# Patient Record
Sex: MALE | Race: WHITE | NOT HISPANIC OR LATINO | URBAN - METROPOLITAN AREA
[De-identification: names, ages, dates, MRNs, and addresses within clinical notes are randomized per-mention and may not be internally consistent; named-entity substitution may affect disease eponyms.]

---

## 2017-01-20 ENCOUNTER — OFFICE (AMBULATORY)
Dept: URBAN - METROPOLITAN AREA CLINIC 75 | Facility: CLINIC | Age: 57
End: 2017-01-20

## 2017-01-20 VITALS
DIASTOLIC BLOOD PRESSURE: 70 MMHG | WEIGHT: 233 LBS | RESPIRATION RATE: 18 BRPM | HEART RATE: 76 BPM | HEIGHT: 73 IN | SYSTOLIC BLOOD PRESSURE: 130 MMHG

## 2017-01-20 DIAGNOSIS — R12 HEARTBURN: ICD-10-CM

## 2017-01-20 DIAGNOSIS — Z86.010 PERSONAL HISTORY OF COLONIC POLYPS: ICD-10-CM

## 2017-01-20 DIAGNOSIS — K57.30 DIVERTICULOSIS OF LARGE INTESTINE WITHOUT PERFORATION OR ABS: ICD-10-CM

## 2017-01-20 DIAGNOSIS — R07.9 CHEST PAIN, UNSPECIFIED: ICD-10-CM

## 2017-01-20 DIAGNOSIS — Z12.11 ENCOUNTER FOR SCREENING FOR MALIGNANT NEOPLASM OF COLON: ICD-10-CM

## 2017-01-20 PROCEDURE — 99214 OFFICE O/P EST MOD 30 MIN: CPT

## 2017-02-13 VITALS
RESPIRATION RATE: 17 BRPM | OXYGEN SATURATION: 99 % | DIASTOLIC BLOOD PRESSURE: 89 MMHG | RESPIRATION RATE: 20 BRPM | SYSTOLIC BLOOD PRESSURE: 111 MMHG | HEART RATE: 64 BPM | DIASTOLIC BLOOD PRESSURE: 77 MMHG | DIASTOLIC BLOOD PRESSURE: 75 MMHG | OXYGEN SATURATION: 100 % | HEART RATE: 61 BPM | OXYGEN SATURATION: 97 % | HEIGHT: 73 IN | DIASTOLIC BLOOD PRESSURE: 99 MMHG | HEART RATE: 69 BPM | DIASTOLIC BLOOD PRESSURE: 69 MMHG | RESPIRATION RATE: 16 BRPM | DIASTOLIC BLOOD PRESSURE: 72 MMHG | OXYGEN SATURATION: 96 % | SYSTOLIC BLOOD PRESSURE: 115 MMHG | OXYGEN SATURATION: 95 % | SYSTOLIC BLOOD PRESSURE: 121 MMHG | TEMPERATURE: 98 F | SYSTOLIC BLOOD PRESSURE: 126 MMHG | HEART RATE: 78 BPM | HEART RATE: 62 BPM | SYSTOLIC BLOOD PRESSURE: 131 MMHG | HEART RATE: 71 BPM | SYSTOLIC BLOOD PRESSURE: 138 MMHG | SYSTOLIC BLOOD PRESSURE: 153 MMHG | DIASTOLIC BLOOD PRESSURE: 86 MMHG | WEIGHT: 230 LBS | TEMPERATURE: 97.1 F

## 2017-02-14 ENCOUNTER — OFFICE (AMBULATORY)
Dept: URBAN - METROPOLITAN AREA CLINIC 64 | Facility: CLINIC | Age: 57
End: 2017-02-14

## 2017-02-14 ENCOUNTER — AMBULATORY SURGICAL CENTER (AMBULATORY)
Dept: URBAN - METROPOLITAN AREA SURGERY 17 | Facility: SURGERY | Age: 57
End: 2017-02-14

## 2017-02-14 DIAGNOSIS — R12 HEARTBURN: ICD-10-CM

## 2017-02-14 DIAGNOSIS — R07.9 CHEST PAIN, UNSPECIFIED: ICD-10-CM

## 2017-02-14 DIAGNOSIS — K31.9 DISEASE OF STOMACH AND DUODENUM, UNSPECIFIED: ICD-10-CM

## 2017-02-14 DIAGNOSIS — K29.70 GASTRITIS, UNSPECIFIED, WITHOUT BLEEDING: ICD-10-CM

## 2017-02-14 DIAGNOSIS — K31.7 POLYP OF STOMACH AND DUODENUM: ICD-10-CM

## 2017-02-14 LAB
GI HISTOLOGY: A. UNSPECIFIED: (no result)
GI HISTOLOGY: B. UNSPECIFIED: (no result)
GI HISTOLOGY: PDF REPORT: (no result)

## 2017-02-14 PROCEDURE — 43239 EGD BIOPSY SINGLE/MULTIPLE: CPT

## 2017-02-14 PROCEDURE — 88305 TISSUE EXAM BY PATHOLOGIST: CPT

## 2017-02-14 RX ADMIN — LIDOCAINE HYDROCHLORIDE 50 MG: 10 INJECTION, SOLUTION EPIDURAL; INFILTRATION; INTRACAUDAL; PERINEURAL at 10:17

## 2017-02-14 RX ADMIN — LIDOCAINE HYDROCHLORIDE 25 MG: 10 INJECTION, SOLUTION EPIDURAL; INFILTRATION; INTRACAUDAL; PERINEURAL at 10:19

## 2017-02-14 RX ADMIN — PROPOFOL 50 MG: 10 INJECTION, EMULSION INTRAVENOUS at 10:19

## 2017-02-14 RX ADMIN — PROPOFOL 50 MG: 10 INJECTION, EMULSION INTRAVENOUS at 10:24

## 2017-02-14 RX ADMIN — PROPOFOL 100 MG: 10 INJECTION, EMULSION INTRAVENOUS at 10:17

## 2018-04-04 ENCOUNTER — OFFICE VISIT CONVERTED (OUTPATIENT)
Dept: FAMILY MEDICINE CLINIC | Age: 58
End: 2018-04-04
Attending: NURSE PRACTITIONER

## 2018-06-20 ENCOUNTER — OFFICE VISIT CONVERTED (OUTPATIENT)
Dept: FAMILY MEDICINE CLINIC | Age: 58
End: 2018-06-20
Attending: NURSE PRACTITIONER

## 2019-01-15 ENCOUNTER — OFFICE VISIT CONVERTED (OUTPATIENT)
Dept: FAMILY MEDICINE CLINIC | Age: 59
End: 2019-01-15
Attending: NURSE PRACTITIONER

## 2019-02-01 ENCOUNTER — HOSPITAL ENCOUNTER (OUTPATIENT)
Dept: OTHER | Facility: HOSPITAL | Age: 59
Discharge: HOME | End: 2019-02-01
Attending: NURSE PRACTITIONER

## 2019-02-01 LAB
25(OH)D3 SERPL-MCNC: 38.2 NG/ML (ref 30–100)
ALBUMIN SERPL-MCNC: 4.3 G/DL (ref 3.5–5)
ALBUMIN/GLOB SERPL: 1.5 {RATIO} (ref 1.4–2.6)
ALP SERPL-CCNC: 72 U/L (ref 56–119)
ALT SERPL-CCNC: 28 U/L (ref 10–40)
ANION GAP SERPL CALC-SCNC: 20 MMOL/L (ref 8–19)
AST SERPL-CCNC: 23 U/L (ref 15–50)
BASOPHILS # BLD MANUAL: 0.03 10*3/UL (ref 0–0.2)
BASOPHILS NFR BLD MANUAL: 0.6 % (ref 0–3)
BILIRUB SERPL-MCNC: 1.07 MG/DL (ref 0.2–1.3)
BUN SERPL-MCNC: 14 MG/DL (ref 5–25)
BUN/CREAT SERPL: 14 {RATIO} (ref 6–20)
CALCIUM SERPL-MCNC: 9.2 MG/DL (ref 8.7–10.4)
CHLORIDE SERPL-SCNC: 100 MMOL/L (ref 99–111)
CHOLEST SERPL-MCNC: 187 MG/DL (ref 107–200)
CHOLEST/HDLC SERPL: 4.5 {RATIO} (ref 3–6)
CONV CO2: 26 MMOL/L (ref 22–32)
CONV TOTAL PROTEIN: 7.1 G/DL (ref 6.3–8.2)
CREAT UR-MCNC: 1.01 MG/DL (ref 0.7–1.2)
DEPRECATED RDW RBC AUTO: 41 FL
EOSINOPHIL # BLD MANUAL: 0.07 10*3/UL (ref 0–0.7)
EOSINOPHIL NFR BLD MANUAL: 1.4 % (ref 0–7)
ERYTHROCYTE [DISTWIDTH] IN BLOOD BY AUTOMATED COUNT: 12.1 % (ref 11.5–14.5)
GFR SERPLBLD BASED ON 1.73 SQ M-ARVRAT: >60 ML/MIN/{1.73_M2}
GLOBULIN UR ELPH-MCNC: 2.8 G/DL (ref 2–3.5)
GLUCOSE SERPL-MCNC: 96 MG/DL (ref 70–99)
GRANS (ABSOLUTE): 2.95 10*3/UL (ref 2–8)
GRANS: 59.1 % (ref 30–85)
HBA1C MFR BLD: 16.7 G/DL (ref 14–18)
HCT VFR BLD AUTO: 48.3 % (ref 42–52)
HDLC SERPL-MCNC: 42 MG/DL (ref 40–60)
IMM GRANULOCYTES # BLD: 0.01 10*3/UL (ref 0–0.54)
IMM GRANULOCYTES NFR BLD: 0.2 % (ref 0–0.43)
LDLC SERPL CALC-MCNC: 124 MG/DL (ref 70–100)
LYMPHOCYTES # BLD MANUAL: 1.3 10*3/UL (ref 1–5)
LYMPHOCYTES NFR BLD MANUAL: 12.6 % (ref 3–10)
MCH RBC QN AUTO: 31.3 PG (ref 27–31)
MCHC RBC AUTO-ENTMCNC: 34.6 G/DL (ref 33–37)
MCV RBC AUTO: 90.4 FL (ref 80–96)
MONOCYTES # BLD AUTO: 0.63 10*3/UL (ref 0.2–1.2)
OSMOLALITY SERPL CALC.SUM OF ELEC: 294 MOSM/KG (ref 273–304)
PLATELET # BLD AUTO: 172 10*3/UL (ref 130–400)
PMV BLD AUTO: 10.3 FL (ref 7.4–10.4)
POTASSIUM SERPL-SCNC: 4.3 MMOL/L (ref 3.5–5.3)
PSA SERPL-MCNC: 0.7 NG/ML (ref 0–4)
RBC # BLD AUTO: 5.34 10*6/UL (ref 4.7–6.1)
SODIUM SERPL-SCNC: 142 MMOL/L (ref 135–147)
TRIGL SERPL-MCNC: 106 MG/DL (ref 40–150)
TSH SERPL-ACNC: 3.66 M[IU]/L (ref 0.27–4.2)
VARIANT LYMPHS NFR BLD MANUAL: 26.1 % (ref 20–45)
VLDLC SERPL-MCNC: 21 MG/DL (ref 5–37)
WBC # BLD AUTO: 4.99 10*3/UL (ref 4.8–10.8)

## 2019-02-03 LAB
SHBG SERPL-SCNC: 54.5 NMOL/L (ref 19.3–76.4)
TESTOST SERPL-MCNC: 584 NG/DL (ref 264–916)
TESTOSTERONE, FREE: 9 PG/ML (ref 7.2–24)

## 2019-02-11 ENCOUNTER — HOSPITAL ENCOUNTER (OUTPATIENT)
Dept: OTHER | Facility: HOSPITAL | Age: 59
Discharge: HOME | End: 2019-02-11
Attending: NURSE PRACTITIONER

## 2019-02-11 ENCOUNTER — OFFICE VISIT CONVERTED (OUTPATIENT)
Dept: FAMILY MEDICINE CLINIC | Age: 59
End: 2019-02-11
Attending: NURSE PRACTITIONER

## 2019-07-24 ENCOUNTER — OFFICE VISIT CONVERTED (OUTPATIENT)
Dept: FAMILY MEDICINE CLINIC | Age: 59
End: 2019-07-24
Attending: NURSE PRACTITIONER

## 2019-07-26 ENCOUNTER — HOSPITAL ENCOUNTER (OUTPATIENT)
Dept: OTHER | Facility: HOSPITAL | Age: 59
Discharge: HOME | End: 2019-07-26
Attending: NURSE PRACTITIONER

## 2019-07-26 LAB
ANION GAP SERPL CALC-SCNC: 16 MMOL/L (ref 8–19)
BUN SERPL-MCNC: 14 MG/DL (ref 5–25)
BUN/CREAT SERPL: 13 {RATIO} (ref 6–20)
CALCIUM SERPL-MCNC: 9 MG/DL (ref 8.7–10.4)
CHLORIDE SERPL-SCNC: 99 MMOL/L (ref 99–111)
CONV CO2: 26 MMOL/L (ref 22–32)
CREAT UR-MCNC: 1.07 MG/DL (ref 0.7–1.2)
GFR SERPLBLD BASED ON 1.73 SQ M-ARVRAT: >60 ML/MIN/{1.73_M2}
GLUCOSE SERPL-MCNC: 100 MG/DL (ref 70–99)
OSMOLALITY SERPL CALC.SUM OF ELEC: 285 MOSM/KG (ref 273–304)
POTASSIUM SERPL-SCNC: 4.2 MMOL/L (ref 3.5–5.3)
SODIUM SERPL-SCNC: 137 MMOL/L (ref 135–147)

## 2020-03-04 ENCOUNTER — OFFICE VISIT CONVERTED (OUTPATIENT)
Dept: FAMILY MEDICINE CLINIC | Age: 60
End: 2020-03-04
Attending: NURSE PRACTITIONER

## 2020-05-29 ENCOUNTER — HOSPITAL ENCOUNTER (OUTPATIENT)
Dept: OTHER | Facility: HOSPITAL | Age: 60
Discharge: HOME | End: 2020-05-29
Attending: NURSE PRACTITIONER

## 2020-05-29 LAB
ALBUMIN SERPL-MCNC: 4.1 G/DL (ref 3.5–5)
ALBUMIN/GLOB SERPL: 1.8 {RATIO} (ref 1.4–2.6)
ALP SERPL-CCNC: 71 U/L (ref 56–119)
ALT SERPL-CCNC: 19 U/L (ref 10–40)
ANION GAP SERPL CALC-SCNC: 14 MMOL/L (ref 8–19)
AST SERPL-CCNC: 21 U/L (ref 15–50)
BILIRUB SERPL-MCNC: 0.87 MG/DL (ref 0.2–1.3)
BUN SERPL-MCNC: 13 MG/DL (ref 5–25)
BUN/CREAT SERPL: 13 {RATIO} (ref 6–20)
CALCIUM SERPL-MCNC: 9.2 MG/DL (ref 8.7–10.4)
CHLORIDE SERPL-SCNC: 102 MMOL/L (ref 99–111)
CHOLEST SERPL-MCNC: 160 MG/DL (ref 107–200)
CHOLEST/HDLC SERPL: 4.6 {RATIO} (ref 3–6)
CONV CO2: 25 MMOL/L (ref 22–32)
CONV TOTAL PROTEIN: 6.4 G/DL (ref 6.3–8.2)
CREAT UR-MCNC: 1.03 MG/DL (ref 0.7–1.2)
GFR SERPLBLD BASED ON 1.73 SQ M-ARVRAT: >60 ML/MIN/{1.73_M2}
GLOBULIN UR ELPH-MCNC: 2.3 G/DL (ref 2–3.5)
GLUCOSE SERPL-MCNC: 96 MG/DL (ref 70–99)
HDLC SERPL-MCNC: 35 MG/DL (ref 40–60)
LDLC SERPL CALC-MCNC: 97 MG/DL (ref 70–100)
OSMOLALITY SERPL CALC.SUM OF ELEC: 284 MOSM/KG (ref 273–304)
POTASSIUM SERPL-SCNC: 4.3 MMOL/L (ref 3.5–5.3)
SODIUM SERPL-SCNC: 137 MMOL/L (ref 135–147)
TRIGL SERPL-MCNC: 140 MG/DL (ref 40–150)
VLDLC SERPL-MCNC: 28 MG/DL (ref 5–37)

## 2020-09-24 ENCOUNTER — OFFICE VISIT CONVERTED (OUTPATIENT)
Dept: FAMILY MEDICINE CLINIC | Age: 60
End: 2020-09-24
Attending: NURSE PRACTITIONER

## 2020-09-29 ENCOUNTER — HOSPITAL ENCOUNTER (OUTPATIENT)
Dept: OTHER | Facility: HOSPITAL | Age: 60
Discharge: HOME | End: 2020-09-29
Attending: NURSE PRACTITIONER

## 2020-09-29 LAB
ALBUMIN SERPL-MCNC: 4 G/DL (ref 3.5–5)
ALBUMIN/GLOB SERPL: 1.7 {RATIO} (ref 1.4–2.6)
ALP SERPL-CCNC: 71 U/L (ref 56–119)
ALT SERPL-CCNC: 21 U/L (ref 10–40)
ANION GAP SERPL CALC-SCNC: 13 MMOL/L (ref 8–19)
AST SERPL-CCNC: 21 U/L (ref 15–50)
BILIRUB SERPL-MCNC: 0.69 MG/DL (ref 0.2–1.3)
BUN SERPL-MCNC: 14 MG/DL (ref 5–25)
BUN/CREAT SERPL: 12 {RATIO} (ref 6–20)
CALCIUM SERPL-MCNC: 9.2 MG/DL (ref 8.7–10.4)
CHLORIDE SERPL-SCNC: 99 MMOL/L (ref 99–111)
CHOLEST SERPL-MCNC: 192 MG/DL (ref 107–200)
CHOLEST/HDLC SERPL: 4.6 {RATIO} (ref 3–6)
CONV CO2: 28 MMOL/L (ref 22–32)
CONV TOTAL PROTEIN: 6.4 G/DL (ref 6.3–8.2)
CREAT UR-MCNC: 1.14 MG/DL (ref 0.7–1.2)
GFR SERPLBLD BASED ON 1.73 SQ M-ARVRAT: >60 ML/MIN/{1.73_M2}
GLOBULIN UR ELPH-MCNC: 2.4 G/DL (ref 2–3.5)
GLUCOSE SERPL-MCNC: 102 MG/DL (ref 70–99)
HDLC SERPL-MCNC: 42 MG/DL (ref 40–60)
LDLC SERPL CALC-MCNC: 129 MG/DL (ref 70–100)
OSMOLALITY SERPL CALC.SUM OF ELEC: 283 MOSM/KG (ref 273–304)
POTASSIUM SERPL-SCNC: 4.4 MMOL/L (ref 3.5–5.3)
SODIUM SERPL-SCNC: 136 MMOL/L (ref 135–147)
TRIGL SERPL-MCNC: 103 MG/DL (ref 40–150)
VLDLC SERPL-MCNC: 21 MG/DL (ref 5–37)

## 2021-03-09 ENCOUNTER — OFFICE VISIT CONVERTED (OUTPATIENT)
Dept: FAMILY MEDICINE CLINIC | Age: 61
End: 2021-03-09
Attending: NURSE PRACTITIONER

## 2021-04-16 ENCOUNTER — HOSPITAL ENCOUNTER (OUTPATIENT)
Dept: OTHER | Facility: HOSPITAL | Age: 61
Discharge: HOME | End: 2021-04-16
Attending: NURSE PRACTITIONER

## 2021-04-16 LAB
ALBUMIN SERPL-MCNC: 4 G/DL (ref 3.5–5)
ALBUMIN/GLOB SERPL: 1.8 {RATIO} (ref 1.4–2.6)
ALP SERPL-CCNC: 72 U/L (ref 56–119)
ALT SERPL-CCNC: 21 U/L (ref 10–40)
ANION GAP SERPL CALC-SCNC: 12 MMOL/L (ref 8–19)
AST SERPL-CCNC: 22 U/L (ref 15–50)
BILIRUB SERPL-MCNC: 1.07 MG/DL (ref 0.2–1.3)
BUN SERPL-MCNC: 18 MG/DL (ref 5–25)
BUN/CREAT SERPL: 16 {RATIO} (ref 6–20)
CALCIUM SERPL-MCNC: 9 MG/DL (ref 8.7–10.4)
CHLORIDE SERPL-SCNC: 99 MMOL/L (ref 99–111)
CHOLEST SERPL-MCNC: 166 MG/DL (ref 107–200)
CHOLEST/HDLC SERPL: 3.8 {RATIO} (ref 3–6)
CONV CO2: 28 MMOL/L (ref 22–32)
CONV TOTAL PROTEIN: 6.2 G/DL (ref 6.3–8.2)
CREAT UR-MCNC: 1.11 MG/DL (ref 0.7–1.2)
GFR SERPLBLD BASED ON 1.73 SQ M-ARVRAT: >60 ML/MIN/{1.73_M2}
GLOBULIN UR ELPH-MCNC: 2.2 G/DL (ref 2–3.5)
GLUCOSE SERPL-MCNC: 92 MG/DL (ref 70–99)
HDLC SERPL-MCNC: 44 MG/DL (ref 40–60)
LDLC SERPL CALC-MCNC: 102 MG/DL (ref 70–100)
OSMOLALITY SERPL CALC.SUM OF ELEC: 282 MOSM/KG (ref 273–304)
POTASSIUM SERPL-SCNC: 4.2 MMOL/L (ref 3.5–5.3)
SODIUM SERPL-SCNC: 135 MMOL/L (ref 135–147)
TRIGL SERPL-MCNC: 100 MG/DL (ref 40–150)
VLDLC SERPL-MCNC: 20 MG/DL (ref 5–37)

## 2021-05-18 NOTE — PROGRESS NOTES
"Filemon Lazaro 1960     Office/Outpatient Visit    Visit Date: Mon, Feb 11, 2019 04:29 pm    Provider: Stacey Gonzalez N.P. (Assistant: Sarah Spurling, MA)    Location: [facility]        Electronically signed by Stacey Gonzalez N.P. on  02/11/2019 05:59:02 PM                             SUBJECTIVE:        CC:     Jose Luis is a 58 year old White male.  Prevenative Exam.          HPI:         Patient complains of health checkup.  His last physical exam was 2 years ago.      Smoking Status:  Nonsmoker HEALTH RISK PROFILE (\"At Risk\" items are starred):         Smoking: Life-long non-smoker;     Immunization Status: Up to date;     Nutrition: Healthy, well-balanced diet;     Physical Activity: Exercises at least 3 times per week;     Alcohol/Drug Use: Is a non-drinker; No illicit drug use;     Safety: Always wears seatbelt;         PHQ-9 Depression Screening: Completed form scanned and in chart; Total Score 0 Alcohol Consumption Screening: Completed form scanned and in chart; Total Score 0     ROS:     CONSTITUTIONAL:  Positive for fatigue ( severe ).   Negative for chills or fever.      EYES:  Negative for blurred vision.      E/N/T:  Negative for nasal congestion and sore throat.      CARDIOVASCULAR:  Negative for chest pain and palpitations.      RESPIRATORY:  Negative for recent cough and dyspnea.      GASTROINTESTINAL:  Negative for abdominal pain, nausea and vomiting.      GENITOURINARY:  Positive for nocturia (2 X per night).      MUSCULOSKELETAL:  Negative for myalgias.      INTEGUMENTARY:  Negative for atypical mole(s) and rash.      NEUROLOGICAL:  Negative for paresthesias and weakness.      PSYCHIATRIC:  Positive for insomnia.   Negative for anxiety or depression.  he does not have issues going to sleep, does not usually snores, does get up to take dog out and go to the bathroom         PM/FMH/SH:     Last Reviewed on 2/11/2019 05:28 PM by Stacey Gonzalez    Past Medical History:     "     Hypertension         Surgical History:         Vasectomy     Procedures: EGD 2017     COLONOSCOPY: was last done 2016 with the following abnormalaties noted-- hemorrhoids and diverticuli The next one is due   [facility]         Family History:     Father:  at age 75; Cause of death was COPD;  Hypertension;  COPD     Mother: Healthy     Brother(s): Healthy; 1 brother(s) total     Sister(s): Healthy; 1 sister(s) total     Paternal Grandfather:  at age 72;  Hypertension     Paternal Grandmother:  at age 70's; Cause of death was stroke;  Hypertension     Maternal Grandfather:  at age 81; Cause of death was COPD     Maternal Grandmother:  at age 95; Cause of death was pneumonia         Social History:     Occupation: Employed at [employer] technician     Marital Status:      Children: 1 child         Tobacco/Alcohol/Supplements:     Last Reviewed on 2019 04:30 PM by Spurling, Sarah C    Tobacco: He has never smoked.  Non-drinker         Substance Abuse History:     Last Reviewed on 2016 03:59 PM by Tad Borden    None         Mental Health History:     Last Reviewed on 2016 03:59 PM by Tad Borden        Communicable Diseases (eg STDs):     Last Reviewed on 2016 03:59 PM by Tad Borden            Immunizations:     Hep A, adult dose 2018     Hep A, adult dose 1/15/2019     Fluarix pf 2018     Flulaval 2010     Fluzone (3 + years dose) 2008     Fluzone (3 + years dose) 10/17/2017     Fluzone Quadrivalent (3+ years) 2016     Influenza, split virus (3+ years dose) 10/31/2007     Adacel (Tdap) 2016         Allergies:     Last Reviewed on 2019 04:30 PM by Spurling, Sarah C      No Known Drug Allergies.         Current Medications:     Last Reviewed on 2019 04:34 PM by Spurling, Sarah C    Amlodipine  5mg Tablet Take 1 tablet(s) by mouth daily     Benazepril HCl 20mg Tablet Take  1 tablet(s) by mouth daily     Hydrochlorothiazide (HCTZ) 25mg Tablet Take 1/2 tablet(s) by mouth daily     Prilosec OTC 20mg Tablets, Delayed Release 1 po QD     Fish Oil 500mg Softgel capsule     Multivitamins Tablet 1 tab daily     Aspirin (ASA) 325mg Tablet 1/2 tablet qd         OBJECTIVE:        Vitals:         Current: 2/11/2019 4:37:17 PM    Ht:  6 ft, 1 in;  Wt: 242.6 lbs;  BMI: 32.0    T: 97.9 F (oral);  BP: 129/78 mm Hg (right arm, sitting);  P: 62 bpm (right arm (BP Cuff), sitting);  sCr: 1.01 mg/dL;  GFR: 91.44        Exams:     PHYSICAL EXAM:     GENERAL: Vitals recorded well developed, well nourished;  well groomed;  no apparent distress;     EYES: lids and lacrimal system are normal in appearance; extraocular movements intact; conjunctiva and cornea are normal; PERRLA;     E/N/T:  normal EACs, TMs, nasal/oral mucosa, teeth, gingiva, and oropharynx;     NECK:  supple, full ROM; no thyromegaly; no carotid bruits;     RESPIRATORY: normal respiratory rate and pattern with no distress; normal breath sounds with no rales, rhonchi, wheezes or rubs;     CARDIOVASCULAR: normal rate; rhythm is regular;  no systolic murmur; no edema;     GASTROINTESTINAL: nontender, nondistended; no hepatosplenomegaly or masses; no bruits;     LYMPHATIC: no enlargement of cervical or facial nodes;     SKIN:  no significant rashes or lesions; no suspicious moles;     MUSCULOSKELETAL:  Normal range of motion, strength and tone;     NEUROLOGIC: GROSSLY INTACT     PSYCHIATRIC:  appropriate affect and demeanor; normal speech pattern; grossly normal memory;         ASSESSMENT           V70.0   Z00.00  Health checkup              DDx:     V79.0   Z13.89  Screening for depression              DDx:     780.79   R53.83  Fatigue              DDx:         ORDERS:         Lab Orders:       [id number]  [facility] Urinalysis, automated, with micro  (Send-Out)           Procedures Ordered:       REFER  Referral to Specialist or Other Facility   (Send-Out)           Other Orders:         Calculated BMI above the upper parameter and a follow-up plan was documented in the medical record  (In-House)           Negative EtOH screen  (In-House)                   PLAN:          Health checkup send for sleep study /reviewed recent labs he had     LABORATORY:  Labs ordered to be performed today include urinalysis with micro.  MIPS     BMI Elevated - Follow-Up Plan: He was provided education on weight loss strategies     COUNSELING was provided today regarding the following topics: healthy eating habits, weight loss program, low salt diet, regular exercise, and use of seat belts.      FOLLOW-UP: Schedule follow-up appointments on a p.r.n. basis.            Orders:       [id number]  [facility] - [facility] Urinalysis, automated, with micro  (Send-Out)           Calculated BMI above the upper parameter and a follow-up plan was documented in the medical record  (In-House)             Patient Education Handouts:       Physical Exam 50-59 year, Male           Screening for depression     MIPS PHQ-9 Depression Screening Completed form scanned and in chart; Total Score 0 Negative alcohol screen           Orders:         Negative EtOH screen  (In-House)            Fatigue         REFERRALS:  Referral initiated to sleep study, evaluation/ fatigue.            Orders:       REFER  Referral to Specialist or Other Facility  (Send-Out)             Patient Education Handouts:       Sleep Apnea              Patient Recommendations:        For  Health checkup:     Limit dietary intake of fat (especially saturated fat) and cholesterol.  Eat a variety of foods, including plenty of fruits, vegetables, and grain containg fiber, limit fat intake to 30% of total calories. Balance caloric intake with energy expended. Maintaining regular physical activity is advised to help prevent heart disease, hypertension, diabetes, and obesity. Always use shoulder/lap restraints when driving or  riding in a vehicle, even those equipped with air bags.  Schedule follow-up appointments as needed.          For  Fatigue:     I also recommend sleep study, evaluation/ fatigue.              CHARGE CAPTURE           **Please note: ICD descriptions below are intended for billing purposes only and may not represent clinical diagnoses**        Primary Diagnosis:         V70.0 Health checkup            Z00.00    Encounter for general adult medical examination without abnormal findings              Orders:          [id number]   Preventive medicine, established patient, age 40-64 years  (In-House)                Calculated BMI above the upper parameter and a follow-up plan was documented in the medical record  (In-House)           V79.0 Screening for depression            Z13.89    Encounter for screening for other disorder              Orders:             Negative EtOH screen  (In-House)           780.79 Fatigue            R53.83    Other fatigue

## 2021-05-18 NOTE — PROGRESS NOTES
Filemon Lazaro  1960     Office/Outpatient Visit    Visit Date: Thu, Sep 24, 2020 09:47 am    Provider: Stacey Gonzalez NREE (Assistant: Cheo Solomon, )    Location: [facility]        Electronically signed by Stacey Gonzalez N.P. on  2020 12:50:38 PM                             Subjective:        CC: Jose Luis is a 59 year old White male.  This is a follow-up visit.          HPI:           PHQ-9 Depression Screening: Completed form scanned and in chart; Total Score 0           Dx with essential (primary) hypertension; his current cardiac medication regimen includes a diuretic ( Hydrochlorothiazide (HCTZ) ), an ACE inhibitor ( Lotensin ), and a calcium channel blocker ( Norvasc ).  He is tolerating the medication well without side effects.  Compliance with treatment has been good; he takes his medication as directed.      ROS:     CONSTITUTIONAL:  Negative for chills, fatigue, fever, and weight change.      CARDIOVASCULAR:  Negative for chest pain, palpitations, tachycardia, orthopnea, and edema.      RESPIRATORY:  Negative for cough, dyspnea, and hemoptysis.      GASTROINTESTINAL:  Positive for acid reflux symptoms.  takes PPI     GENITOURINARY:  Positive for erectile dysfunction.  for a year, would like to try viagra    NEUROLOGICAL:  Negative for dizziness, headaches, paresthesias, and weakness.          Past Medical History / Family History / Social History:         Last Reviewed on 2020 12:45 PM by Stacey Gonzalez    Past Medical History:             PAST MEDICAL HISTORY         Hypertension     Sleep Apnea: dx'd in ;         Surgical History:         Vasectomy     Procedures: EGD 2017     COLONOSCOPY: was last done 2016 with the following abnormalaties noted-- hemorrhoids and diverticuli The next one is due   [facility]         Family History:     Father:  at age 75; Cause of death was COPD;  Hypertension;  COPD     Mother: Healthy     Brother(s):  Healthy; 1 brother(s) total     Sister(s): Healthy; 1 sister(s) total     Paternal Grandfather:  at age 72;  Hypertension     Paternal Grandmother:  at age 70's; Cause of death was stroke;  Hypertension     Maternal Grandfather:  at age 81; Cause of death was COPD     Maternal Grandmother:  at age 95; Cause of death was pneumonia         Social History:     Occupation: [employer] technician     Marital Status:      Children: 1 child         Tobacco/Alcohol/Supplements:     Last Reviewed on 2020 09:50 AM by Cheo Solomon    Tobacco: He has never smoked.  Non-drinker         Substance Abuse History:     Last Reviewed on 2016 03:59 PM by Tad Borden    None         Mental Health History:     Last Reviewed on 2016 03:59 PM by Tad Borden        Communicable Diseases (eg STDs):     Last Reviewed on 2016 03:59 PM by Tad Borden        Immunizations:     influenza, injectable, quadrivalent, preservative free 3/4/2020    Hep A, adult dose 2018    Hep A, adult dose 1/15/2019    Fluarix pf 2018    Flulaval 2010    Fluzone (3 + years dose) 2008    Fluzone (3 + years dose) 10/17/2017    Fluzone Quadrivalent (3+ years) 2016    Influenza, split virus (3+ years dose) 10/31/2007    Adacel (Tdap) 2016        Allergies:     Last Reviewed on 2020 09:50 AM by Cheo Solomon    No Known Allergies.        Current Medications:     Last Reviewed on 2020 09:51 AM by Cheo Solomon    hydroCHLOROthiazide 25 mg oral tablet [Take 1/2 (one-half) tablet by mouth once daily]    amLODIPine 5 mg oral tablet [Take 1 tablet(s) by mouth daily]    benazepriL 20 mg oral tablet [Take 1 tablet by mouth once daily]    aspirin 325 mg oral tablet [1/2 tablet qd]    multivitamin oral tablet [1 tab daily]    Prilosec OTC 20 mg oral tablet, delayed release (enteric coated) [1 po QD ]    Fish Oil 300-500 mg oral capsule     triamcinolone acetonide 0.1 % Topical Cream [apply a thin layer to the affected area(s) by topical route 2 times per day]        Objective:        Vitals:         Current: 9/24/2020 9:53:07 AM    Ht:  6 ft, 1 in;  Wt: 234.6 lbs;  BMI: 31.0T: 97.7 F (temporal);  BP: 130/80 mm Hg (right arm, sitting);  P: 60 bpm (right arm (BP Cuff), sitting);  sCr: 1.03 mg/dL;  GFR: 87.36        Exams:     PHYSICAL EXAM:     GENERAL: Vitals recorded well developed, well nourished;  well groomed;  no apparent distress;     NECK: carotid exam reveals no bruits;     RESPIRATORY: normal respiratory rate and pattern with no distress; normal breath sounds with no rales, rhonchi, wheezes or rubs;     CARDIOVASCULAR: normal rate; rhythm is regular;  no systolic murmur; no edema;     PSYCHIATRIC:  appropriate affect and demeanor; normal speech pattern; grossly normal memory;         Assessment:         Z13.31   Encounter for screening for depression       Z23   Encounter for immunization       I10   Essential (primary) hypertension       N52.9   Male erectile dysfunction, unspecified           ORDERS:         Meds Prescribed:       [New Rx] Viagra 100 mg oral tablet [take 1/2 to 1  tablet (100 mg) by oral route once daily as needed approximately 1 hour before sexual activity], #6 (six) tablets, Refills: 1 (one)       [Refilled] benazepriL 20 mg oral tablet [Take 1 tablet by mouth once daily], #90 (ninety) tablets, Refills: 0 (zero)       [Refilled] hydroCHLOROthiazide 25 mg oral tablet [Take 1/2 (one-half) tablet by mouth once daily], #45 (forty five) tablets, Refills: 0 (zero)       [Refilled] amLODIPine 5 mg oral tablet [Take 1 tablet(s) by mouth daily], #90 (ninety) tablets, Refills: 0 (zero)         Lab Orders:       FUTURE  Future order to be done at patients convenience  (Send-Out)            [id number]  [facility] CMP AND LIPID: [id number], [id number]  (Send-Out)              Procedures Ordered:       [id number]  Immunization administration; one vaccine   (In-House)              Other Orders:       [id number]  Influenza virus vaccine, quadrivalent, split virus, preservative free 3 years of age & older  (In-House)              Depression screen negative  (In-House)                      Plan:         [facility]    MIPS PHQ-9 Depression Screening: Completed form scanned and in chart; Total Score 0; Negative Depression Screen           Orders:         Depression screen negative  (In-House)              Encounter for immunization          Immunizations:       [id number]  Immunization administration; one vaccine  (In-House)            31894  Influenza virus vaccine, quadrivalent, split virus, preservative free 3 years of age & older  (In-House)                Dose (ml): 0.5  Site: left deltoid  Route: intramuscular  Administered by: Cheo Solomon          : [email or web address]  Lot #: [id number]  Exp: 06/30/2021          NDC: [address]-26        Essential (primary) hypertensionreviewed last lab in May 2020        FOLLOW-UP TESTING #1: FOLLOW-UP LABORATORY:  Labs to be scheduled in the future include HTN/Lipid Panel: CMP, Lipid.      RECOMMENDATIONS given include: perform routine monitoring of blood pressure with home blood pressure cuff, exercise, and reduction of dietary salt intake.      FOLLOW-UP: fasting for labs           Prescriptions:       [Refilled] benazepriL 20 mg oral tablet [Take 1 tablet by mouth once daily], #90 (ninety) tablets, Refills: 0 (zero)       [Refilled] hydroCHLOROthiazide 25 mg oral tablet [Take 1/2 (one-half) tablet by mouth once daily], #45 (forty five) tablets, Refills: 0 (zero)       [Refilled] amLODIPine 5 mg oral tablet [Take 1 tablet(s) by mouth daily], #90 (ninety) tablets, Refills: 0 (zero)           Orders:       FUTURE  Future order to be done at patients convenience  (Send-Out)            [id number]  HTN - [facility] CMP AND LIPID: [id number], [id number]  (Send-Out)              Male erectile  dysfunction, unspecifiedrisk vs benefit of rx discussed, trial of viagra /reviewed testosterone lab from past           Prescriptions:       [New Rx] Viagra 100 mg oral tablet [take 1/2 to 1  tablet (100 mg) by oral route once daily as needed approximately 1 hour before sexual activity], #6 (six) tablets, Refills: 1 (one)             Patient Recommendations:        For  Essential (primary) hypertension:            The following laboratory testing has been ordered: Begin monitoring your blood pressure by brief nurse visits at our office, a home blood pressure monitor, or by checking on the machines in pharmacies or stores.  Keep a log of the readings. Maintain a regular exercise program. Reduce the amount of salt in your diet.              Charge Capture:         Primary Diagnosis:     Z13.31  Encounter for screening for depression           Orders:      [id number]  Office/outpatient visit; established patient, level 4  (In-House)              Depression screen negative  (In-House)              Z23  Encounter for immunization           Orders:      41781  Immunization administration; one vaccine  (In-House)            [id number]  Influenza virus vaccine, quadrivalent, split virus, preservative free 3 years of age & older  (In-House)              I10  Essential (primary) hypertension     N52.9  Male erectile dysfunction, unspecified

## 2021-05-18 NOTE — PROGRESS NOTES
Filemon LazaroMini 1960     Office/Outpatient Visit    Visit Date:  10:50 am    Provider: Stacey Gonzalez N.P. (Assistant: Nancie Cruz MA)    Location: [facility]        Electronically signed by Stacey Gonzalez N.P. on  2019 12:36:53 PM                             SUBJECTIVE:        CC:     Jose Luis is a 58 year old White male.  This is a follow-up visit.  med refill; discuss sleep study         HPI:         Patient presents with essential hypertension.  His current cardiac medication regimen includes a diuretic ( HCTZ ), an ACE inhibitor ( Lotensin ), and a calcium channel blocker ( Norvasc ).  Jose Luis does not check his blood pressure other than at his clinic appointments.  Compliance with treatment has been good; he takes his medication as directed.      ROS:     CONSTITUTIONAL:  Positive for fatigue.      CARDIOVASCULAR:  Negative for chest pain, palpitations, tachycardia, orthopnea, and edema.      RESPIRATORY:  Positive for + sleep study for sleep apnea, has not gotten suppliies to treat yet due to insurance/ cost.   Negative for recent cough or dyspnea.      GASTROINTESTINAL:  Positive for acid reflux symptoms ( takes PPI ).      NEUROLOGICAL:  Negative for dizziness, headaches, paresthesias, and weakness.          [facility]/SH:     Last Reviewed on 2019 11:18 AM by Stacey Gonzalez    Past Medical History:             PAST MEDICAL HISTORY         Hypertension     Sleep Apnea: dx'd in ;         Surgical History:         Vasectomy     Procedures: EGD 2017     COLONOSCOPY: was last done 2016 with the following abnormalaties noted-- hemorrhoids and diverticuli The next one is due   [facility]         Family History:     Father:  at age 75; Cause of death was COPD;  Hypertension;  COPD     Mother: Healthy     Brother(s): Healthy; 1 brother(s) total     Sister(s): Healthy; 1 sister(s) total     Paternal Grandfather:  at age 72;  Hypertension      Paternal Grandmother:  at age 70's; Cause of death was stroke;  Hypertension     Maternal Grandfather:  at age 81; Cause of death was COPD     Maternal Grandmother:  at age 95; Cause of death was pneumonia         Social History:     Occupation: Employed at [employer] technician     Marital Status:      Children: 1 child         Tobacco/Alcohol/Supplements:     Last Reviewed on 2019 10:55 AM by Nancie Cruz    Tobacco: He has never smoked.  Non-drinker         Substance Abuse History:     Last Reviewed on 2016 03:59 PM by Tad Borden    None         Mental Health History:     Last Reviewed on 2016 03:59 PM by Tad Borden        Communicable Diseases (eg STDs):     Last Reviewed on 2016 03:59 PM by Tad Borden            Immunizations:     Hep A, adult dose 2018     Hep A, adult dose 1/15/2019     Fluarix pf 2018     Flulaval 2010     Fluzone (3 + years dose) 2008     Fluzone (3 + years dose) 10/17/2017     Fluzone Quadrivalent (3+ years) 2016     Influenza, split virus (3+ years dose) 10/31/2007     Adacel (Tdap) 2016         Allergies:     Last Reviewed on 2019 10:55 AM by Nancie Cruz      No Known Drug Allergies.         Current Medications:     Last Reviewed on 2019 10:55 AM by Nancie Cruz    Amlodipine  5mg Tablet Take 1 tablet(s) by mouth daily     Benazepril HCl 20mg Tablet Take 1 tablet(s) by mouth daily     Hydrochlorothiazide (HCTZ) 25mg Tablet Take 1/2 tablet(s) by mouth daily     Prilosec OTC 20mg Tablets, Delayed Release 1 po QD     Fish Oil 500mg Softgel capsule     Multivitamins Tablet 1 tab daily     Aspirin (ASA) 325mg Tablet 1/2 tablet qd         OBJECTIVE:        Vitals:         Current: 2019 10:57:07 AM    Ht:  6 ft, 1 in;  Wt: 241.4 lbs;  BMI: 31.8    T: 98.4 F (oral);  BP: 126/74 mm Hg (left arm, sitting);  P: 67 bpm (left arm (BP  Cuff), sitting);  sCr: 1.01 mg/dL;  GFR: 91.25        Exams:     PHYSICAL EXAM:     GENERAL: Vitals recorded well developed, well nourished;  well groomed;  no apparent distress;     NECK: carotid exam reveals no bruits;     RESPIRATORY: normal respiratory rate and pattern with no distress; normal breath sounds with no rales, rhonchi, wheezes or rubs;     CARDIOVASCULAR: normal rate; rhythm is regular;  no systolic murmur; no edema;     PSYCHIATRIC:  appropriate affect and demeanor; normal speech pattern; grossly normal memory;         ASSESSMENT           401.1   I10  Essential hypertension              DDx:     780.57   G47.33  ROSIBEL              DDx:         ORDERS:         Meds Prescribed:       Refill of: Amlodipine  5mg Tablet Take 1 tablet(s) by mouth daily  #90 (Ninety) tablet(s) Refills: 1       Refill of: Benazepril HCl 20mg Tablet Take 1 tablet(s) by mouth daily  #90 (Ninety) tablet(s) Refills: 1       Refill of: Hydrochlorothiazide (HCTZ) 25mg Tablet Take 1/2 tablet(s) by mouth daily  #45 (Forty Five) tablet(s) Refills: 1         Lab Orders:       [id number]  [facility] Basic Metabolic Panel  (Send-Out)         APPTO  Appointment need  (In-House)                   PLAN:          Essential hypertension reviewed last labs, will check fasting lab at next appt     LABORATORY:  Labs ordered to be performed today include basic metabolic panel.      FOLLOW-UP: Schedule a follow-up visit in 6 months..      RECOMMENDATIONS given include: perform routine monitoring of blood pressure with home blood pressure cuff, exercise, reduction of dietary salt intake, stress reduction, and Advised about free BP checks on the last Saturday of each month.            Prescriptions:       Refill of: Amlodipine  5mg Tablet Take 1 tablet(s) by mouth daily  #90 (Ninety) tablet(s) Refills: 1       Refill of: Benazepril HCl 20mg Tablet Take 1 tablet(s) by mouth daily  #90 (Ninety) tablet(s) Refills: 1       Refill of: Hydrochlorothiazide  (HCTZ) 25mg Tablet Take 1/2 tablet(s) by mouth daily  #45 (Forty Five) tablet(s) Refills: 1           Orders:       [id number]  [facility] Basic Metabolic Panel  (Send-Out)         APPTO  Appointment need  (In-House)            ROSIBEL advised to have his sleep apnea treated, reviewed pulmonology note, his insurance does not cover the group he went to, gave him copy of his report, to schedule with who his insurance covers             Patient Recommendations:        For  Essential hypertension:     Schedule a follow-up visit in 6 months.                APPOINTMENT INFORMATION:        Monday Tuesday Wednesday Thursday Friday Saturday Sunday            Time:___________________AM  PM   Date:_____________________ Begin monitoring your blood pressure by brief nurse visits at our office, a home blood pressure monitor, or by checking on the machines in pharmacies or stores.  Keep a log of the readings. Maintain a regular exercise program. Reduce the amount of salt in your diet. Try and reduce the effects of stress on blood pressure by relaxation, meditation, biofeedback, exercise or other stress reduction methods.              CHARGE CAPTURE           **Please note: ICD descriptions below are intended for billing purposes only and may not represent clinical diagnoses**        Primary Diagnosis:         401.1 Essential hypertension            I10    Essential (primary) hypertension              Orders:          [id number]   Office/outpatient visit; established patient, level 3  (In-House)             APPTO   Appointment need  (In-House)           780.57 ROSIBEL            G47.33    Obstructive sleep apnea (adult) (pediatric)

## 2021-05-18 NOTE — PROGRESS NOTES
Filemon Lazaro 1960     Office/Outpatient Visit    Visit Date:  04:08 pm    Provider: Stacey Gonzalez N.P. (Assistant: Wendi Steinberg MA)    Location: [facility]        Electronically signed by Stacey Gonzalez N.P. on  2018 05:47:30 PM                             SUBJECTIVE:        CC:     Jose Luis is a 57 year old White male.  This is a follow-up visit.  Med refill         HPI:         Jose Luis presents with essential hypertension.  His current cardiac medication regimen includes a diuretic ( HCTZ ), an ACE inhibitor ( Lotensin ), and a calcium channel blocker ( Norvasc ).  Jose Luis does not check his blood pressure other than at his clinic appointments.  He is tolerating the medication well without side effects.      ROS:     CONSTITUTIONAL:  Negative for chills, fatigue, fever, and weight change.      CARDIOVASCULAR:  Negative for chest pain, palpitations, tachycardia, orthopnea, and edema.      RESPIRATORY:  Negative for cough, dyspnea, and hemoptysis.      GASTROINTESTINAL:  Positive for acid reflux symptoms ( takes PPI ).      NEUROLOGICAL:  Negative for dizziness, headaches, paresthesias, and weakness.          [facility]/SH:     Last Reviewed on 2018 05:45 PM by Stacey Gonzalez    Past Medical History:         Hypertension         Surgical History:         Vasectomy     Procedures: EGD 2017     COLONOSCOPY: was last done 2016 with the following abnormalaties noted-- hemorrhoids and diverticuli The next one is due   [facility]         Family History:     Father:  at age 75; Cause of death was COPD;  Hypertension;  COPD     Mother: Healthy     Brother(s): Healthy; 1 brother(s) total     Sister(s): Healthy; 1 sister(s) total     Paternal Grandfather:  at age 72;  Hypertension     Paternal Grandmother:  at age 70's; Cause of death was stroke;  Hypertension     Maternal Grandfather:  at age 81; Cause of death was COPD     Maternal  Grandmother:  at age 95; Cause of death was pneumonia         Social History:     Occupation: Employed at [employer] technician     Marital Status:      Children: 1 child         Tobacco/Alcohol/Supplements:     Last Reviewed on 2018 04:12 PM by Wendi Steinberg    Tobacco: He has never smoked.  Non-drinker         Substance Abuse History:     Last Reviewed on 2016 03:59 PM by Tad Borden    None         Mental Health History:     Last Reviewed on 2016 03:59 PM by Tad Borden        Communicable Diseases (eg STDs):     Last Reviewed on 2016 03:59 PM by Tad Borden            Immunizations:     Flulaval 2010     Fluzone (3 + years dose) 2008     Fluzone (3 + years dose) 10/17/2017     Fluzone Quadrivalent (3+ years) 2016     Influenza, split virus (3+ years dose) 10/31/2007     Adacel (Tdap) 2016         Allergies:     Last Reviewed on 2018 04:10 PM by Wendi Steinberg      No Known Drug Allergies.         Current Medications:     Last Reviewed on 2018 04:11 PM by Wendi Steinberg    Benazepril HCl 20mg Tablet Take 1 tablet(s) by mouth daily     Hydrochlorothiazide (HCTZ) 25mg Tablet Take 1/2 tablet(s) by mouth daily     Amlodipine  5mg Tablet Take 1 tablet(s) by mouth daily     Prilosec OTC 20mg Tablets, Delayed Release 1 po QD     Fish Oil 500mg Softgel capsule     Multivitamins Tablet 1 tab daily     Aspirin (ASA) 325mg Tablet 1/2 tablet qd         OBJECTIVE:        Vitals:         Current: 2018 4:10:30 PM    Ht:  6 ft, 1 in;  Wt: 241.9 lbs;  BMI: 31.9    T: 98.8 F (oral);  BP: 134/80 mm Hg (left arm, standing);  P: 81 bpm (left arm (BP Cuff), standing);  sCr: 0.95 mg/dL;  GFR: 98.23        Exams:     PHYSICAL EXAM:     GENERAL: Vitals recorded well developed, well nourished;  well groomed;  no apparent distress;     NECK: carotid exam reveals no bruits;     RESPIRATORY: normal respiratory rate and pattern  with no distress; normal breath sounds with no rales, rhonchi, wheezes or rubs;     CARDIOVASCULAR: normal rate; rhythm is regular;  no systolic murmur; trace pedal edema;     PSYCHIATRIC:  appropriate affect and demeanor; normal speech pattern; grossly normal memory;         Procedures:     Vaccination against viral hepatitis     1. Hepatitis A (adult): 1.0 ml given IM in the left upper arm; administered by [facility];  lot number [id number]; expires 11-21-20             ASSESSMENT           401.1   I10  Essential hypertension              DDx:     530.81   K21.9  GERD              DDx:     V05.3   Z23  Vaccination against viral hepatitis              DDx:         ORDERS:         Meds Prescribed:       Refill of: Benazepril HCl 20mg Tablet Take 1 tablet(s) by mouth daily  #90 (Ninety) tablet(s) Refills: 1       Refill of: Hydrochlorothiazide (HCTZ) 25mg Tablet Take 1/2 tablet(s) by mouth daily  #45 (Forty Five) tablet(s) Refills: 1       Refill of: Amlodipine  5mg Tablet Take 1 tablet(s) by mouth daily  #90 (Ninety) tablet(s) Refills: 1       Refill of: Prilosec OTC (Omeprazole)  20mg Tablets, Delayed Release 1 po QD  #90 (Ninety) tablet(s) Refills: 1         Lab Orders:       [id number]  [facility] Basic Metabolic Panel  (Send-Out)           Procedures Ordered:       [id number]  Immunization administration; one vaccine  (In-House)         17307  HepA vaccine adult dose for intramuscular use  (In-House)                   PLAN:          Essential hypertension     LABORATORY:  Labs ordered to be performed today include basic metabolic panel.      RECOMMENDATIONS given include: perform routine monitoring of blood pressure with home blood pressure cuff, reduction of dietary salt intake, and Advised about free BP checks on the last Saturday of each month.      FOLLOW-UP: Schedule a follow-up visit in 6 months.            Prescriptions:       Refill of: Benazepril HCl 20mg Tablet Take 1 tablet(s) by mouth daily  #90 (Ninety) tablet(s) Refills:  1       Refill of: Hydrochlorothiazide (HCTZ) 25mg Tablet Take 1/2 tablet(s) by mouth daily  #45 (Forty Five) tablet(s) Refills: 1       Refill of: Amlodipine  5mg Tablet Take 1 tablet(s) by mouth daily  #90 (Ninety) tablet(s) Refills: 1           Orders:       [id number]  [facility] Basic Metabolic Panel  (Send-Out)            GERD           Prescriptions:       Refill of: Prilosec OTC (Omeprazole)  20mg Tablets, Delayed Release 1 po QD  #90 (Ninety) tablet(s) Refills: 1          Vaccination against viral hepatitis             FOLLOW-UP: Schedule a follow-up appointment in 6 months.            Orders:       [id number]  Immunization administration; one vaccine  (In-House)         97316  HepA vaccine adult dose for intramuscular use  (In-House)             Patient Education Handouts:       Hepatitis A              Patient Recommendations:        For  Essential hypertension:     Begin monitoring your blood pressure by brief nurse visits at our office, a home blood pressure monitor, or by checking on the machines in pharmacies or stores.  Keep a log of the readings. Reduce the amount of salt in your diet.  Schedule a follow-up visit in 6 months.          For  Vaccination against viral hepatitis:                 FOLLOW-UP: Schedule a follow-up visit in 6 months.              CHARGE CAPTURE           **Please note: ICD descriptions below are intended for billing purposes only and may not represent clinical diagnoses**        Primary Diagnosis:         401.1 Essential hypertension            I10    Essential (primary) hypertension              Orders:          [id number]   Office/outpatient visit; established patient, level 4  (In-House)           530.81 GERD            K21.9    Gastro-esophageal reflux disease without esophagitis    V05.3 Vaccination against viral hepatitis            Z23    Encounter for immunization              Orders:          [id number]   Immunization administration; one vaccine  (In-House)             01314   HepA  vaccine adult dose for intramuscular use  (In-House)

## 2021-05-18 NOTE — PROGRESS NOTES
Filemon Lazaro 1960     Office/Outpatient Visit    Visit Date:  02:24 pm    Provider: Stacey Gonzalez N.P. (Assistant: Yara Lainez MA)    Location: [facility]        Electronically signed by Stacey Gonzalez N.P. on  2018 04:23:04 PM                             SUBJECTIVE:        CC:     Jose Luis is a 57 year old White male.  SINUSES, COUGH, EARS STOPPED UP         HPI:         Patient complains of uRI.  These have been present for the past 2 weeks.  The symptoms include productive cough, headache, nasal congestion, nasal discharge and frontal and maxillary sinus pain and pressure.  He denies exposure to ill contacts.  He has already tried to relieve the symptoms with antihistamines and decongestants.  Medical history is significant for recently flew to [place].      ROS:     CONSTITUTIONAL:  Negative for chills, fatigue, fever, and weight change.      E/N/T:  Positive for ear pain ( bilateral ) and diminished hearing ( bilaterally ).   Negative for hoarseness or sore throat.      CARDIOVASCULAR:  Negative for chest pain, palpitations, tachycardia, orthopnea, and edema.      RESPIRATORY:  Negative for cough, dyspnea, and hemoptysis.          [facility]/SH:     Last Reviewed on 2018 02:58 PM by Stacey Gonzalez    Past Medical History:         Hypertension         Surgical History:         Vasectomy     Procedures: EGD 2017     COLONOSCOPY: was last done 2016 with the following abnormalaties noted-- hemorrhoids and diverticuli The next one is due   [facility]         Family History:     Father:  at age 75; Cause of death was COPD;  Hypertension;  COPD     Mother: Healthy     Brother(s): Healthy; 1 brother(s) total     Sister(s): Healthy; 1 sister(s) total     Paternal Grandfather:  at age 72;  Hypertension     Paternal Grandmother:  at age 70's; Cause of death was stroke;  Hypertension     Maternal Grandfather:  at age 81; Cause of death was  COPD     Maternal Grandmother:  at age 95; Cause of death was pneumonia         Social History:     Occupation: Employed at [employer] technician     Marital Status:      Children: 1 child         Tobacco/Alcohol/Supplements:     Last Reviewed on 2018 02:58 PM by Stacey Gonzalez    Tobacco: He has never smoked.  Non-drinker             Immunizations:     Flulaval 2010     Fluzone (3 + years dose) 2008     Fluzone Quadrivalent (3+ years) 2016     Influenza, split virus (3+ years dose) 10/31/2007     Adacel (Tdap) 2016         Allergies:     Last Reviewed on 2018 02:27 PM by Yara Lainez      No Known Drug Allergies.         Current Medications:     Last Reviewed on 2018 02:58 PM by Stacey Gonzalez    Amlodipine  5mg Tablet Take 1 tablet(s) by mouth daily     Benazepril HCl 20mg Tablet Take 1 tablet(s) by mouth daily     Hydrochlorothiazide (HCTZ) 25mg Tablet Take 1/2 tablet(s) by mouth daily     Prilosec OTC 20mg Tablets, Delayed Release 1 po QD     Fish Oil 500mg Softgel capsule     Multivitamins Tablet 1 tab daily     Aspirin (ASA) 325mg Tablet 1/2 tablet qd         OBJECTIVE:        Vitals:         Current: 2018 2:27:10 PM    Ht:  6 ft, 1 in;  Wt: 237.1 lbs;  BMI: 31.3    T: 98.2 F (oral);  BP: 139/84 mm Hg (left arm, standing);  P: 84 bpm (left arm (BP Cuff), standing);  sCr: 0.95 mg/dL;  GFR: 97.40        Exams:     PHYSICAL EXAM:     GENERAL:  well developed and nourished; appropriately groomed; in no apparent distress;     E/N/T: EARS: both TMs are red, retracted, and lansmarks obscured;  NOSE: bilateral maxillary and bilateral frontal sinus tenderness present;     RESPIRATORY: normal respiratory rate and pattern with no distress; normal breath sounds with no rales, rhonchi, wheezes or rubs;     CARDIOVASCULAR: normal rate; rhythm is regular;  no systolic murmur;     LYMPHATIC: no enlargement of cervical or facial nodes;         ASSESSMENT            461.8   J01.80  Acute sinusitis, other              DDx:         ORDERS:         Meds Prescribed:       Amoxicillin 875mg Tablet One PO BID X 10 days.  #20 (Twenty) tablet(s) Refills: 0                 PLAN:          Acute sinusitis, other Discussed avoiding decongestants.          RECOMMENDATIONS given include: rest, increase oral fluid intake, and reduce fever with acetaminophen or ibuprofen.      FOLLOW-UP: Advised to call if there is no improvement in 4 day(s).            Prescriptions:       Amoxicillin 875mg Tablet One PO BID X 10 days.  #20 (Twenty) tablet(s) Refills: 0           Patient Education Handouts:       Sinus Infection (Sinusitis)              Patient Recommendations:        For  Acute sinusitis, other:     Get plenty of rest. Increase oral fluid intake. Reduce fever as needed with acetaminophen or ibuprofen.              CHARGE CAPTURE           **Please note: ICD descriptions below are intended for billing purposes only and may not represent clinical diagnoses**        Primary Diagnosis:         461.8 Acute sinusitis, other            J01.80    Other acute sinusitis              Orders:          [id number]   Office/outpatient visit; established patient, level 3  (In-House)

## 2021-05-18 NOTE — PROGRESS NOTES
Filemon LazaroMini 1960     Office/Outpatient Visit    Visit Date: Tue, Rafael 15, 2019 04:21 pm    Provider: Stacey Gonzalez N.P. (Assistant: Sonia Yanez LPN)    Location: [facility]        Electronically signed by Stacey Gonzalez N.P. on  01/15/2019 05:36:13 PM                             SUBJECTIVE:        CC:     Jose Luis is a 58 year old White male.  med refills         HPI:         Patient presents with essential hypertension.  His current cardiac medication regimen includes a diuretic ( HCTZ ), an ACE inhibitor ( Lotensin ), and a calcium channel blocker ( Norvasc ).  Jose Luis does not check his blood pressure other than at his clinic appointments.  He is tolerating the medication well without side effects.  Compliance with treatment has been good; he takes his medication as directed.      ROS:     CONSTITUTIONAL:  Positive for fatigue ( 1-2 years ).      CARDIOVASCULAR:  Negative for chest pain, palpitations, tachycardia, orthopnea, and edema.      RESPIRATORY:  Negative for cough, dyspnea, and hemoptysis.  did snore at times     GASTROINTESTINAL:  Positive for acid reflux symptoms ( takes OTC prilosec/belching helps ).      GENITOURINARY:  Positive for depressed libido.      NEUROLOGICAL:  Negative for dizziness, headaches, paresthesias, and weakness.          PMH/FMH/SH:     Last Reviewed on 1/15/2019 04:34 PM by Stacey Gonzalez    Past Medical History:         Hypertension         Surgical History:         Vasectomy     Procedures: EGD 2017     COLONOSCOPY: was last done 2016 with the following abnormalaties noted-- hemorrhoids and diverticuli The next one is due   [facility]         Family History:     Father:  at age 75; Cause of death was COPD;  Hypertension;  COPD     Mother: Healthy     Brother(s): Healthy; 1 brother(s) total     Sister(s): Healthy; 1 sister(s) total     Paternal Grandfather:  at age 72;  Hypertension     Paternal Grandmother:  at age 70's;  Cause of death was stroke;  Hypertension     Maternal Grandfather:  at age 81; Cause of death was COPD     Maternal Grandmother:  at age 95; Cause of death was pneumonia         Social History:     Occupation: Employed at [employer] technician     Marital Status:      Children: 1 child         Tobacco/Alcohol/Supplements:     Last Reviewed on 1/15/2019 04:26 PM by Sonia Yanez    Tobacco: He has never smoked.  Non-drinker         Substance Abuse History:     Last Reviewed on 2016 03:59 PM by Tad Borden    None         Mental Health History:     Last Reviewed on 2016 03:59 PM by Tad Borden        Communicable Diseases (eg STDs):     Last Reviewed on 2016 03:59 PM by Tad Borden            Immunizations:     Hep A, adult dose 2018     Fluarix pf 2018     Flulaval 2010     Fluzone (3 + years dose) 2008     Fluzone (3 + years dose) 10/17/2017     Fluzone Quadrivalent (3+ years) 2016     Influenza, split virus (3+ years dose) 10/31/2007     Adacel (Tdap) 2016         Allergies:     Last Reviewed on 1/15/2019 04:26 PM by Sonia Yanez      No Known Drug Allergies.         Current Medications:     Last Reviewed on 1/15/2019 04:26 PM by Sonia Yanez    Benazepril HCl 20mg Tablet Take 1 tablet(s) by mouth daily     Amlodipine  5mg Tablet Take 1 tablet(s) by mouth daily     Hydrochlorothiazide (HCTZ) 25mg Tablet Take 1/2 tablet(s) by mouth daily     Prilosec OTC 20mg Tablets, Delayed Release 1 po QD     Fish Oil 500mg Softgel capsule     Multivitamins Tablet 1 tab daily     Aspirin (ASA) 325mg Tablet 1/2 tablet qd         OBJECTIVE:        Vitals:         Current: 1/15/2019 4:26:23 PM    Ht:  6 ft, 1 in;  Wt: 239.6 lbs;  BMI: 31.6    T: 98.1 F (oral);  BP: 153/80 mm Hg (left arm, sitting);  P: 67 bpm (left arm (BP Cuff), sitting);  sCr: 1 mg/dL;  GFR: 91.87        Repeat:     5:33:21 PM      BP:   133/78mm Hg (right arm, sitting)         Exams:     PHYSICAL EXAM:     GENERAL: Vitals recorded well developed, well nourished;  well groomed;  no apparent distress;     E/N/T: OROPHARYNX:  normal mucosa, dentition, gingiva, and posterior pharynx;     NECK: carotid exam reveals no bruits;     RESPIRATORY: normal respiratory rate and pattern with no distress; normal breath sounds with no rales, rhonchi, wheezes or rubs;     CARDIOVASCULAR: normal rate; rhythm is regular;  no systolic murmur; no edema;     PSYCHIATRIC:  appropriate affect and demeanor; normal speech pattern; grossly normal memory;         Procedures:     Vaccination against hepatitis A     1. Hepatitis A (adult): 1.0 ml given IM in the right upper arm; administered by mnp;  lot number [id number]; expires 9/11/21             ASSESSMENT           401.1   I10  Essential hypertension              DDx:     V76.44   Z12.5  Screening for prostate cancer              DDx:     Fatigue    780.79   R53.83  Fatigue              DDx:     V05.3   Z23  Vaccination against hepatitis A              DDx:         ORDERS:         Meds Prescribed:       Refill of: Benazepril HCl 20mg Tablet Take 1 tablet(s) by mouth daily  #90 (Ninety) tablet(s) Refills: 1       Refill of: Amlodipine  5mg Tablet Take 1 tablet(s) by mouth daily  #90 (Ninety) tablet(s) Refills: 1       Refill of: Hydrochlorothiazide (HCTZ) 25mg Tablet Take 1/2 tablet(s) by mouth daily  #45 (Forty Five) tablet(s) Refills: 1         Lab Orders:       FUTURE  Future order to be done at patients convenience  (Send-Out)         [id number]  HTNLP - [facility] CMP AND LIPID: [id number]  (Send-Out)         FUTURE  Future order to be done at patients convenience  (Send-Out)         *  PRSAS Medicare screening PSA  (Send-Out)         FUTURE  Future order to be done at patients convenience  (Send-Out)         96479  BDCBC [facility] CBC with 3 part diff  (Send-Out)         46977  TFTSG [facility] Testosterone, free and Total  weakly bound  (Send-Out)         11513  TSH - [facility] TSH  (Send-Out)         84980  VITD - [facility] Vitamin D, 25 Hydroxy  (Send-Out)           Procedures Ordered:       13804  Immunization administration; one vaccine  (In-House)         00036  HepA vaccine adult dose for intramuscular use  (In-House)                   PLAN:          Essential hypertension due second hep A         FOLLOW-UP TESTING #1: FOLLOW-UP LABORATORY:  Labs to be scheduled in the future include HTN/Lipid Panel: CMP, Lipid.      RECOMMENDATIONS given include: perform routine monitoring of blood pressure with home blood pressure cuff, exercise, reduction of dietary salt intake, stress reduction, and Advised about free BP checks on the last Saturday of each month.      FOLLOW-UP: fasting for labs           Prescriptions:       Refill of: Benazepril HCl 20mg Tablet Take 1 tablet(s) by mouth daily  #90 (Ninety) tablet(s) Refills: 1       Refill of: Amlodipine  5mg Tablet Take 1 tablet(s) by mouth daily  #90 (Ninety) tablet(s) Refills: 1       Refill of: Hydrochlorothiazide (HCTZ) 25mg Tablet Take 1/2 tablet(s) by mouth daily  #45 (Forty Five) tablet(s) Refills: 1           Orders:       FUTURE  Future order to be done at patients convenience  (Send-Out)         04402  HTNLP - [facility] CMP AND LIPID: [id number]  (Send-Out)             Patient Education Handouts:       Cholesterol Screening           Screening for prostate cancer         FOLLOW-UP TESTING #1: FOLLOW-UP LABORATORY:  Labs to be scheduled in the future include PSA.            Orders:       FUTURE  Future order to be done at patients convenience  (Send-Out)         *  PRSAS Medicare screening PSA  (Send-Out)            Fatigue consider sleep study         FOLLOW-UP TESTING #1: FOLLOW-UP LABORATORY:  Labs to be scheduled in the future include CBC, Testosterone free and total, TSH, and Vitamin D 25.            Orders:       FUTURE  Future order to be done at patients convenience  (Send-Out)          12672  BDCBC - [facility] CBC with 3 part diff  (Send-Out)         11074  TFTSG - [facility] Testosterone, free and Total weakly bound  (Send-Out)         75778  TSH - [facility] TSH  (Send-Out)         91613  VITD - [facility] Vitamin D, 25 Hydroxy  (Send-Out)            Vaccination against hepatitis A           Orders:       33046  Immunization administration; one vaccine  (In-House)         95600  HepA vaccine adult dose for intramuscular use  (In-House)               Patient Recommendations:        For  Essential hypertension:             The following laboratory testing has been ordered: Begin monitoring your blood pressure by brief nurse visits at our office, a home blood pressure monitor, or by checking on the machines in pharmacies or stores.  Keep a log of the readings. Maintain a regular exercise program. Reduce the amount of salt in your diet. Try and reduce the effects of stress on blood pressure by relaxation, meditation, biofeedback, exercise or other stress reduction methods.          For  Screening for prostate cancer:             The following laboratory testing has been ordered:         For  Fatigue:             The following laboratory testing has been ordered: CBC TSH             CHARGE CAPTURE           **Please note: ICD descriptions below are intended for billing purposes only and may not represent clinical diagnoses**        Primary Diagnosis:         401.1 Essential hypertension            I10    Essential (primary) hypertension              Orders:          00236   Office/outpatient visit; established patient, level 4  (In-House)           V76.44 Screening for prostate cancer            Z12.5    Encounter for screening for malignant neoplasm of prostate    Fatigue    780.79 Fatigue            R53.83    Other fatigue    V05.3 Vaccination against hepatitis A            Z23    Encounter for immunization              Orders:          62867   Immunization administration; one vaccine  (In-House)             86030   HepA  vaccine adult dose for intramuscular use  (In-House)

## 2021-05-18 NOTE — PROGRESS NOTES
Filemon Lazaro  1960     Office/Outpatient Visit    Visit Date: Tue, Mar 9, 2021 10:18 am    Provider: Stacey Gonzalez N.P. (Assistant: Vicky Crouch, )    Location: [facility]        Electronically signed by Stacey Gonzalez N.P. on  2021 11:50:48 AM                             Subjective:        CC: Jose Luis is a 60 year old White male.  This is a follow-up visit.          HPI:           Patient presents with essential (primary) hypertension.  His current cardiac medication regimen includes a diuretic ( Hydrochlorothiazide (HCTZ) ), an ACE inhibitor ( Lotensin ), and a calcium channel blocker ( Norvasc ).  Review of his blood pressure log reveals systolics in the 130s and diastolics in the 70s.  He is tolerating the medication well without side effects.  Compliance with treatment has been good; he takes his medication as directed and maintains his diet and exercise regimen.      ROS:     CONSTITUTIONAL:  Negative for fever.      CARDIOVASCULAR:  Negative for chest pain, palpitations, tachycardia, orthopnea, and edema.      RESPIRATORY:  Negative for recent cough and dyspnea.      NEUROLOGICAL:  Negative for dizziness, headaches, paresthesias, and weakness.          Past Medical History / Family History / Social History:         Last Reviewed on 3/09/2021 10:46 AM by Stacey Gonzalez    Past Medical History:             PAST MEDICAL HISTORY         Hypertension     Sleep Apnea: dx'd in ;     + Covid          Surgical History:         Vasectomy     Procedures: EGD 2017     COLONOSCOPY: was last done 2016 with the following abnormalaties noted-- hemorrhoids and diverticuli The next one is due   [facility]         Family History:     Father:  at age 75; Cause of death was COPD;  Hypertension;  COPD     Mother: Healthy     Brother(s): Healthy; 1 brother(s) total     Sister(s): Healthy; 1 sister(s) total     Paternal Grandfather:  at age 72;   Hypertension     Paternal Grandmother:  at age 70's; Cause of death was stroke;  Hypertension     Maternal Grandfather:  at age 81; Cause of death was COPD     Maternal Grandmother:  at age 95; Cause of death was pneumonia         Social History:     Occupation: [employer] technician     Marital Status:      Children: 1 child         Tobacco/Alcohol/Supplements:     Last Reviewed on 3/09/2021 10:24 AM by Vicky Crouch    Tobacco: He has never smoked.  Non-drinker         Substance Abuse History:     Last Reviewed on 2016 03:59 PM by Tad Borden    None         Mental Health History:     Last Reviewed on 2016 03:59 PM by Tad Borden        Communicable Diseases (eg STDs):     Last Reviewed on 2016 03:59 PM by Tad Borden        Immunizations:     influenza, injectable, quadrivalent, preservative free 3/4/2020    influenza, injectable, quadrivalent, preservative free (FLUZONE QUAD [id number]) 2020    Hep A, adult dose 2018    Hep A, adult dose 1/15/2019    Fluarix pf 2018    Flulaval 2010    Fluzone (3 + years dose) 2008    Fluzone (3 + years dose) 10/17/2017    Fluzone Quadrivalent (3+ years) 2016    Influenza, split virus (3+ years dose) 10/31/2007    Adacel (Tdap) 2016        Allergies:     Last Reviewed on 3/09/2021 10:24 AM by Vicky Crouch    No Known Allergies.        Current Medications:     Last Reviewed on 3/09/2021 10:24 AM by Vicky Crouch    hydroCHLOROthiazide 25 mg oral tablet [Take 1/2 (one-half) tablet by mouth once daily]    amLODIPine 5 mg oral tablet [Take 1 tablet(s) by mouth daily]    benazepriL 20 mg oral tablet [Take 1 tablet by mouth once daily]    aspirin 325 mg oral tablet [1/2 tablet qd]    multivitamin oral tablet [1 tab daily]    Prilosec OTC 20 mg oral tablet, delayed release (enteric coated) [1 po QD ]    Fish Oil 300-500 mg oral capsule     triamcinolone acetonide 0.1 % Topical Cream [apply a thin layer to the affected area(s) by topical route 2 times per day]    Viagra 100 mg oral tablet [take 1/2 to 1  tablet (100 mg) by oral route once daily as needed approximately 1 hour before sexual activity]        Objective:        Vitals:         Current: 3/9/2021 10:26:22 AM    Ht:  6 ft, 1 in;  Wt: 233.4 lbs;  BMI: 30.8T: 97.1 F (temporal);  BP: 157/89 mm Hg (left arm, sitting);  P: 64 bpm (left arm (BP Cuff), sitting);  sCr: 1.14 mg/dL;  GFR: 77.83        Repeat:     10:56:41 AM  BP:   125/76mm Hg (left arm, sitting, HR: 62)     Exams:     PHYSICAL EXAM:     GENERAL:  well developed and nourished; appropriately groomed; in no apparent distress;     NECK: carotid exam reveals no bruits;     RESPIRATORY: normal respiratory rate and pattern with no distress; normal breath sounds with no rales, rhonchi, wheezes or rubs;     CARDIOVASCULAR: normal rate; rhythm is regular;  no systolic murmur; no edema;     PSYCHIATRIC:  appropriate affect and demeanor; normal speech pattern; grossly normal memory;         Assessment:         I10   Essential (primary) hypertension           ORDERS:         Meds Prescribed:       [Refilled] benazepriL 20 mg oral tablet [Take 1 tablet by mouth once daily], #90 (ninety) tablets, Refills: 0 (zero)       [Refilled] hydroCHLOROthiazide 25 mg oral tablet [Take 1/2 (one-half) tablet by mouth once daily], #45 (forty five) tablets, Refills: 0 (zero)       [Refilled] amLODIPine 5 mg oral tablet [Take 1 tablet(s) by mouth daily], #90 (ninety) tablets, Refills: 0 (zero)         Lab Orders:       FUTURE  Future order to be done at patients convenience  (Send-Out)            08592  [facility] CMP AND LIPID: [id number]  (Send-Out)                      Plan:         Essential (primary) hypertensiondiscussed shingles and covid vaccines / advised vaccines         FOLLOW-UP TESTING #1: FOLLOW-UP LABORATORY:  Labs to be scheduled in the future  include HTN/Lipid Panel: CMP, Lipid.      RECOMMENDATIONS given include: perform routine monitoring of blood pressure with home blood pressure cuff.      FOLLOW-UP: pending labs           Prescriptions:       [Refilled] benazepriL 20 mg oral tablet [Take 1 tablet by mouth once daily], #90 (ninety) tablets, Refills: 0 (zero)       [Refilled] hydroCHLOROthiazide 25 mg oral tablet [Take 1/2 (one-half) tablet by mouth once daily], #45 (forty five) tablets, Refills: 0 (zero)       [Refilled] amLODIPine 5 mg oral tablet [Take 1 tablet(s) by mouth daily], #90 (ninety) tablets, Refills: 0 (zero)           Orders:       FUTURE  Future order to be done at patients convenience  (Send-Out)            54591  [facility] CMP AND LIPID: [id number]  (Send-Out)                  Patient Recommendations:        For  Essential (primary) hypertension:            The following laboratory testing has been ordered: Begin monitoring your blood pressure by brief nurse visits at our office, a home blood pressure monitor, or by checking on the machines in pharmacies or stores.  Keep a log of the readings.              Charge Capture:         Primary Diagnosis:     I10  Essential (primary) hypertension           Orders:      82817  Office/outpatient visit; established patient, level 3  (In-House)

## 2021-05-18 NOTE — PROGRESS NOTES
Filemon Lazaro  1960     Office/Outpatient Visit    Visit Date: Wed, Mar 4, 2020 03:38 pm    Provider: Stacey Gonzalez N.P. (Assistant: Vicky Crouch, )    Location: [facility]        Electronically signed by Stacey Gonzalez N.P. on  2020 04:05:12 PM                             Subjective:        CC: Jose Luis is a 59 year old White male.  This is a follow-up visit.  med refills         HPI:           Patient presents with essential (primary) hypertension.  His current cardiac medication regimen includes a diuretic ( Hydrochlorothiazide (HCTZ) ), an ACE inhibitor ( Lotensin ), and a calcium channel blocker ( Norvasc ).  Jose Luis does not check his blood pressure other than at his clinic appointments.  He is tolerating the medication well without side effects.  Compliance with treatment has been good; he takes his medication as directed.      ROS:     CONSTITUTIONAL:  Negative for chills, fatigue, fever, and weight change.      CARDIOVASCULAR:  Negative for chest pain, palpitations, tachycardia, orthopnea, and edema.      RESPIRATORY:  Negative for cough, dyspnea, and hemoptysis.      INTEGUMENTARY:  Positive for rash on lower lateral right leg for 2-3 weeks, OTC antifungal.  it does itch     NEUROLOGICAL:  Negative for dizziness, headaches, paresthesias, and weakness.          Past Medical History / Family History / Social History:         Last Reviewed on 3/04/2020 03:58 PM by Stacey Gonzalez    Past Medical History:             PAST MEDICAL HISTORY         Hypertension     Sleep Apnea: dx'd in ;         Surgical History:         Vasectomy     Procedures: EGD 2017     COLONOSCOPY: was last done 2016 with the following abnormalaties noted-- hemorrhoids and diverticuli The next one is due   [facility]         Family History:     Father:  at age 75; Cause of death was COPD;  Hypertension;  COPD     Mother: Healthy     Brother(s): Healthy; 1 brother(s) total      Sister(s): Healthy; 1 sister(s) total     Paternal Grandfather:  at age 72;  Hypertension     Paternal Grandmother:  at age 70's; Cause of death was stroke;  Hypertension     Maternal Grandfather:  at age 81; Cause of death was COPD     Maternal Grandmother:  at age 95; Cause of death was pneumonia         Social History:     Occupation: [employer] technician     Marital Status:      Children: 1 child         Tobacco/Alcohol/Supplements:     Last Reviewed on 3/04/2020 03:42 PM by Vicky Crouch    Tobacco: He has never smoked.  Non-drinker         Substance Abuse History:     Last Reviewed on 2016 03:59 PM by Tad Borden    None         Mental Health History:     Last Reviewed on 2016 03:59 PM by Tad Borden        Communicable Diseases (eg STDs):     Last Reviewed on 2016 03:59 PM by Tad Borden        Immunizations:     Hep A, adult dose 2018    Hep A, adult dose 1/15/2019    Fluarix pf 2018    Flulaval 2010    Fluzone (3 + years dose) 2008    Fluzone (3 + years dose) 10/17/2017    Fluzone Quadrivalent (3+ years) 2016    Influenza, split virus (3+ years dose) 10/31/2007    Adacel (Tdap) 2016        Allergies:     Last Reviewed on 3/04/2020 03:42 PM by Vicky Crouch    No Known Allergies.        Current Medications:     Last Reviewed on 3/04/2020 03:42 PM by Vicky Crouch    hydroCHLOROthiazide 25 mg oral tablet [Take 1/2 tablet(s) by mouth daily]    amLODIPine 5 mg oral tablet [Take 1 tablet(s) by mouth daily]    benazepriL 20 mg oral tablet [Take 1 tablet(s) by mouth daily]    aspirin 325 mg oral tablet [1/2 tablet qd]    multivitamin oral tablet [1 tab daily]    Prilosec OTC 20 mg oral tablet, delayed release (enteric coated) [1 po QD ]    Fish Oil 300-500 mg oral capsule        Objective:        Vitals:         Current: 3/4/2020 3:44:37 PM    Ht:  6 ft, 1 in;  Wt: 245.8  lbs;  BMI: 32.4T: 98 F (oral);  BP: 147/90 mm Hg (right arm, sitting);  P: 56 bpm (right arm (BP Cuff), sitting);  sCr: 1.07 mg/dL;  GFR: 85.78        Repeat:     3:45:47 PM  BP:   146/86mm Hg (left arm, sitting, HR: 55)     Exams:     PHYSICAL EXAM:     GENERAL: Vitals recorded well developed, well nourished;  well groomed;  no apparent distress;     NECK: carotid exam reveals no bruits;     RESPIRATORY: normal respiratory rate and pattern with no distress; normal breath sounds with no rales, rhonchi, wheezes or rubs;     CARDIOVASCULAR: normal rate; rhythm is regular;  no systolic murmur; no edema;     SKIN: a rash is noted on the right leg;  the color is mainly pink and red;  it is best characterized as maculopapular; quarter size on lateral mid lower leg     PSYCHIATRIC:  appropriate affect and demeanor; normal speech pattern; grossly normal memory;         Assessment:         I10   Essential (primary) hypertension       R21   Rash and other nonspecific skin eruption           ORDERS:         Meds Prescribed:       [New Rx] triamcinolone acetonide 0.1 % Topical Cream [apply a thin layer to the affected area(s) by topical route 2 times per day], #30 (thirty) grams, Refills: 1 (one)       [Refilled] benazepriL 20 mg oral tablet [Take 1 tablet(s) by mouth daily], #90 (ninety) tablets, Refills: 0 (zero)       [Refilled] hydroCHLOROthiazide 25 mg oral tablet [Take 1/2 tablet(s) by mouth daily], #45 (forty five) tablets, Refills: 0 (zero)       [Refilled] amLODIPine 5 mg oral tablet [Take 1 tablet(s) by mouth daily], #90 (ninety) tablets, Refills: 0 (zero)         Lab Orders:       FUTURE  Future order to be done at patients convenience  (Send-Out)            04410  [facility] CMP AND LIPID: [id number]  (Send-Out)                      Plan:         Essential (primary) hypertensionhe got his flu vaccine at [facility] in [place]        RECOMMENDATIONS given include: perform routine monitoring of blood pressure  with home blood pressure cuff, exercise, reduction of dietary salt intake, and weight loss.      FOLLOW-UP: fasting for labs     FOLLOW-UP TESTING #1: FOLLOW-UP LABORATORY:  Labs to be scheduled in the future include HTN/Lipid Panel: CMP, Lipid.            Prescriptions:       [Refilled] benazepriL 20 mg oral tablet [Take 1 tablet(s) by mouth daily], #90 (ninety) tablets, Refills: 0 (zero)       [Refilled] hydroCHLOROthiazide 25 mg oral tablet [Take 1/2 tablet(s) by mouth daily], #45 (forty five) tablets, Refills: 0 (zero)       [Refilled] amLODIPine 5 mg oral tablet [Take 1 tablet(s) by mouth daily], #90 (ninety) tablets, Refills: 0 (zero)           Orders:       FUTURE  Future order to be done at patients convenience  (Send-Out)            [address]  [facility] CMP AND LIPID: [id number]  (Send-Out)              Rash and other nonspecific skin eruption        FOLLOW-UP: Advised to call if there is no improvement in 2 week(s).            Prescriptions:       [New Rx] triamcinolone acetonide 0.1 % Topical Cream [apply a thin layer to the affected area(s) by topical route 2 times per day], #30 (thirty) grams, Refills: 1 (one)             Patient Recommendations:        For  Essential (primary) hypertension:    Begin monitoring your blood pressure by brief nurse visits at our office, a home blood pressure monitor, or by checking on the machines in pharmacies or stores.  Keep a log of the readings. Maintain a regular exercise program. Reduce the amount of salt in your diet. Try to lose some weight; even modest weight reduction can improve your blood pressure.          The following laboratory testing has been ordered:             Charge Capture:         Primary Diagnosis:     I10  Essential (primary) hypertension           Orders:      12723  Office/outpatient visit; established patient, level 4  (In-House)              R21  Rash and other nonspecific skin eruption         ADDENDUMS:       ____________________________________    Addendum: 03/07/2020 09:24 Stacey Das        Add [id number]; Remove [id number]

## 2021-05-27 ENCOUNTER — CONVERSION ENCOUNTER (OUTPATIENT)
Dept: GASTROENTEROLOGY | Facility: CLINIC | Age: 61
End: 2021-05-27
Attending: INTERNAL MEDICINE

## 2021-06-05 NOTE — H&P
History and Physical      Patient Name: Filemon Lazaro   Patient ID: [id number]   Sex: Male   YOB: 1960        Visit Date: May 27, 2021    Provider: Mat Posada MD   [facility]   Location Address: [address] [place] [address]  [id number]   Location Phone: [phone]          Chief Complaint  · Surgical History and Physical  · Screening Colonoscopy      History Of Present Illness  NON-INPATIENT HISTORY AND PHYSICAL  Allergies: NO KNOWN DRUG ALLERGIES   Chief Complaint/History of Present Illness: Colon Screening   Colon Recall: No   Failed Outpatient Treatment/Contraindications: N/A   Current Medications: amlodipine 5 mg oral tablet, aspirin 81 mg oral tablet,delayed release (DR/EC), benazepril 20 mg oral tablet, Fish Oil 1,000 mg (120 mg-180 mg) oral capsule, hydrochlorothiazide 12.5 mg oral tablet, Multi Vitamin 9 mg iron/15 mL oral liquid, and Suprep Bowel Prep Kit 17.5-3.13-1.6 gram oral recon soln   Significant Past Medical History: PAST MEDICAL HISTORY   Significant Family Medical History: No family history of colon cancer   Significant Past Surgical History: Colonoscopy and EGD   Previous Colonoscopy: Yes YEAR: 2016   Previous EGD: Yes YEAR: 2016   PHYSICAL EXAM:  Heart: Regular Rate and Rhythm   Lungs: Breathing Unlabored           Assessment  · Preoperative examination     V72.84/Z01.818  · Screening for colon cancer     V76.51/Z12.11      Plan  · Orders  o Consent for Colonoscopy Screening -Possible risk/complications, benefits, and alternatives to surgical or invasive procedure have been explained to patient and/or legal gaurdian. -Patient has been evaluated and can tolerate anethesia and/or sedation. Risk, benefits, and alternatives to anesthesia and sedation have been explained to patient or legal gaurdian. () - V72.84/Z01.818, V76.51/Z12.11 - 08/10/2021  · Medications  o Medications have been Reconciled  o Transition of Care or  Provider Policy  · Instructions  o ****Surgical Orders****  o ***************  o Outpatient  o ***************  o RISK AND BENEFITS:  o Possible risks/complications, benefits and alternatives to surgical or invasive procedure have been explained to the patient and/or legal guardian.  o Patient has been evaluated and can tolerate anesthesia and/or sedation. Risks, benefits, and alternatives to anesthesia and sedation have been explained to the patient and/or legal guardian.  o ***************  o PREP: Per protocol  o IV: Per Anesthesia  o The above History and Physical Examination has been completed within 30 days of admission.  o This note has been transcribed by CHRISTOPHER Park. I have read and agree with the findings in this note.  o Electronically Identified Patient Education Materials Provided Electronically            Electronically Signed by: Mary Barron MA -Author on May 27, 2021 08:54:17 AM

## 2021-06-11 DIAGNOSIS — I10 ESSENTIAL HYPERTENSION: Primary | ICD-10-CM

## 2021-06-11 RX ORDER — HYDROCHLOROTHIAZIDE 25 MG/1
25 TABLET ORAL DAILY
COMMUNITY
Start: 2021-03-09 | End: 2021-06-11 | Stop reason: SDUPTHER

## 2021-06-11 RX ORDER — AMLODIPINE BESYLATE 5 MG/1
5 TABLET ORAL DAILY
COMMUNITY
End: 2021-06-11 | Stop reason: SDUPTHER

## 2021-06-11 RX ORDER — BENAZEPRIL HYDROCHLORIDE 20 MG/1
20 TABLET ORAL DAILY
COMMUNITY
End: 2021-06-11 | Stop reason: SDUPTHER

## 2021-06-11 NOTE — TELEPHONE ENCOUNTER
Caller: Filemon Lazaro    Relationship: Self    Best call back number:[phone]     Medication needed: ALL MEDICATION  Requested Prescriptions      No prescriptions requested or ordered in this encounter       When do you need the refill by: TODAY    What additional details did the patient provide when requesting the medication:     Does the patient have less than a 3 day supply:  [x] Yes  [] No    What is the patient's preferred pharmacy:    [address] [facility] [phone]

## 2021-06-11 NOTE — TELEPHONE ENCOUNTER
Requesting Amlodipine 5mg daily, Benazepril 20mg daily and HCTZ 25mg 1/2 tablet daily. LV- 03/09/21, LF- 03/09/21. [facility].

## 2021-06-13 RX ORDER — HYDROCHLOROTHIAZIDE 25 MG/1
12.5 TABLET ORAL DAILY
Qty: 45 TABLET | Refills: 0 | Status: SHIPPED | OUTPATIENT
Start: 2021-06-13 | End: 2021-09-13 | Stop reason: SDUPTHER

## 2021-06-13 RX ORDER — AMLODIPINE BESYLATE 5 MG/1
5 TABLET ORAL DAILY
Qty: 90 TABLET | Refills: 0 | Status: SHIPPED | OUTPATIENT
Start: 2021-06-13 | End: 2021-06-14 | Stop reason: SDUPTHER

## 2021-06-13 RX ORDER — BENAZEPRIL HYDROCHLORIDE 20 MG/1
20 TABLET ORAL DAILY
Qty: 90 TABLET | Refills: 0 | Status: SHIPPED | OUTPATIENT
Start: 2021-06-13 | End: 2021-09-07 | Stop reason: SDUPTHER

## 2021-06-14 DIAGNOSIS — I10 ESSENTIAL HYPERTENSION: ICD-10-CM

## 2021-06-14 RX ORDER — AMLODIPINE BESYLATE 5 MG/1
TABLET ORAL
Qty: 90 TABLET | Refills: 0 | Status: SHIPPED | OUTPATIENT
Start: 2021-06-14 | End: 2021-08-10 | Stop reason: HOSPADM

## 2021-06-14 NOTE — TELEPHONE ENCOUNTER
Caller: Filemon Lazaro    Relationship: Self    Best call back number: [phone]    Medication needed:   Requested Prescriptions     Pending Prescriptions Disp Refills   • amLODIPine (NORVASC) 5 MG tablet 90 tablet 0     Sig: Take 1 tablet by mouth Daily for 90 days.       When do you need the refill by: ASAP    What additional details did the patient provide when requesting the medication: PATIENT STATES THAT HE HAD TO GET A FIVE PILL  QTY FROM THE PHARMACY AND HE HAS 3 PILLS LEFT.    Does the patient have less than a 3 day supply:  [] Yes  [x] No    What is the patient's preferred pharmacy:  [facility] [address] [place] [address] [address] [facility] [phone] [facility] [phone]   [phone]

## 2021-06-14 NOTE — TELEPHONE ENCOUNTER
Pharmacy requesting Amlodipine 5mg one daily. LV- 03/09/21, LF- 03/09/21, #90, last lab- 04/16/21, kidney function normal.

## 2021-06-16 RX ORDER — AMLODIPINE BESYLATE 5 MG/1
5 TABLET ORAL DAILY
Qty: 90 TABLET | Refills: 0 | Status: SHIPPED | OUTPATIENT
Start: 2021-06-16 | End: 2021-09-07 | Stop reason: SDUPTHER

## 2021-07-01 VITALS
TEMPERATURE: 98.2 F | SYSTOLIC BLOOD PRESSURE: 139 MMHG | BODY MASS INDEX: 31.42 KG/M2 | WEIGHT: 237.1 LBS | DIASTOLIC BLOOD PRESSURE: 84 MMHG | HEIGHT: 73 IN | HEART RATE: 84 BPM

## 2021-07-01 VITALS
HEIGHT: 73 IN | DIASTOLIC BLOOD PRESSURE: 80 MMHG | HEART RATE: 81 BPM | BODY MASS INDEX: 32.06 KG/M2 | WEIGHT: 241.9 LBS | SYSTOLIC BLOOD PRESSURE: 134 MMHG | TEMPERATURE: 98.8 F

## 2021-07-01 VITALS
WEIGHT: 242.6 LBS | SYSTOLIC BLOOD PRESSURE: 129 MMHG | DIASTOLIC BLOOD PRESSURE: 78 MMHG | HEART RATE: 62 BPM | TEMPERATURE: 97.9 F | BODY MASS INDEX: 32.15 KG/M2 | HEIGHT: 73 IN

## 2021-07-01 VITALS
WEIGHT: 239.6 LBS | SYSTOLIC BLOOD PRESSURE: 133 MMHG | BODY MASS INDEX: 31.75 KG/M2 | TEMPERATURE: 98.1 F | HEART RATE: 67 BPM | HEIGHT: 73 IN | DIASTOLIC BLOOD PRESSURE: 78 MMHG

## 2021-07-01 VITALS
WEIGHT: 241.4 LBS | HEART RATE: 67 BPM | BODY MASS INDEX: 31.99 KG/M2 | SYSTOLIC BLOOD PRESSURE: 126 MMHG | DIASTOLIC BLOOD PRESSURE: 74 MMHG | HEIGHT: 73 IN | TEMPERATURE: 98.4 F

## 2021-07-02 VITALS
HEART RATE: 64 BPM | WEIGHT: 233.4 LBS | SYSTOLIC BLOOD PRESSURE: 125 MMHG | BODY MASS INDEX: 30.93 KG/M2 | TEMPERATURE: 97.1 F | HEIGHT: 73 IN | DIASTOLIC BLOOD PRESSURE: 76 MMHG

## 2021-07-02 VITALS
TEMPERATURE: 97.7 F | DIASTOLIC BLOOD PRESSURE: 80 MMHG | BODY MASS INDEX: 31.09 KG/M2 | SYSTOLIC BLOOD PRESSURE: 130 MMHG | WEIGHT: 234.6 LBS | HEIGHT: 73 IN | HEART RATE: 60 BPM

## 2021-07-02 VITALS
SYSTOLIC BLOOD PRESSURE: 146 MMHG | TEMPERATURE: 98 F | HEIGHT: 73 IN | WEIGHT: 245.8 LBS | BODY MASS INDEX: 32.58 KG/M2 | HEART RATE: 56 BPM | DIASTOLIC BLOOD PRESSURE: 86 MMHG

## 2021-08-09 ENCOUNTER — TELEPHONE (OUTPATIENT)
Dept: GASTROENTEROLOGY | Facility: CLINIC | Age: 61
End: 2021-08-09

## 2021-08-09 RX ORDER — MULTIPLE VITAMINS W/ MINERALS TAB 9MG-400MCG
1 TAB ORAL DAILY
COMMUNITY

## 2021-08-09 RX ORDER — SODIUM, POTASSIUM,MAG SULFATES 17.5-3.13G
2 SOLUTION, RECONSTITUTED, ORAL ORAL EVERY 12 HOURS
Qty: 177 ML | Refills: 0 | Status: SHIPPED | OUTPATIENT
Start: 2021-08-09 | End: 2021-08-10

## 2021-08-09 RX ORDER — ASPIRIN 325 MG
325 TABLET ORAL DAILY
COMMUNITY

## 2021-08-09 RX ORDER — CHLORAL HYDRATE 500 MG
1000 CAPSULE ORAL DAILY
COMMUNITY

## 2021-08-09 NOTE — PRE-PROCEDURE INSTRUCTIONS
Pt verbalized understanding of all instructions-diet, prep, location of procedure, medications to take/hold-stop vitamins/supplements and NSAIDS today if haven't already, no medications morning of procedure except amlodipine. Pt states that he has not yet picked up his prep, instructed pt to call pharmacy to see if it is available for him to pickup and if not then he needs to call Dr Posada's office, return call here if needed. I also sent an email to Dr Posada's office to make sure prep has been called in for the pt.

## 2021-08-10 ENCOUNTER — ANESTHESIA EVENT (OUTPATIENT)
Dept: GASTROENTEROLOGY | Facility: HOSPITAL | Age: 61
End: 2021-08-10

## 2021-08-10 ENCOUNTER — HOSPITAL ENCOUNTER (OUTPATIENT)
Facility: HOSPITAL | Age: 61
Setting detail: HOSPITAL OUTPATIENT SURGERY
Discharge: HOME | End: 2021-08-10
Attending: INTERNAL MEDICINE | Admitting: INTERNAL MEDICINE

## 2021-08-10 ENCOUNTER — ANESTHESIA (OUTPATIENT)
Dept: GASTROENTEROLOGY | Facility: HOSPITAL | Age: 61
End: 2021-08-10

## 2021-08-10 VITALS
WEIGHT: 232.81 LBS | HEIGHT: 73 IN | OXYGEN SATURATION: 96 % | SYSTOLIC BLOOD PRESSURE: 141 MMHG | RESPIRATION RATE: 18 BRPM | BODY MASS INDEX: 30.85 KG/M2 | TEMPERATURE: 97.2 F | DIASTOLIC BLOOD PRESSURE: 100 MMHG | HEART RATE: 59 BPM

## 2021-08-10 PROCEDURE — 45378 DIAGNOSTIC COLONOSCOPY: CPT | Performed by: INTERNAL MEDICINE

## 2021-08-10 PROCEDURE — 25010000002 PROPOFOL 10 MG/ML EMULSION: Performed by: NURSE ANESTHETIST, CERTIFIED REGISTERED

## 2021-08-10 RX ORDER — SODIUM CHLORIDE, SODIUM LACTATE, POTASSIUM CHLORIDE, CALCIUM CHLORIDE 600; 310; 30; 20 MG/100ML; MG/100ML; MG/100ML; MG/100ML
30 INJECTION, SOLUTION INTRAVENOUS CONTINUOUS
Status: DISCONTINUED | OUTPATIENT
Start: 2021-08-10 | End: 2021-08-10 | Stop reason: HOSPADM

## 2021-08-10 RX ORDER — PROPOFOL 10 MG/ML
VIAL (ML) INTRAVENOUS AS NEEDED
Status: DISCONTINUED | OUTPATIENT
Start: 2021-08-10 | End: 2021-08-10 | Stop reason: SURG

## 2021-08-10 RX ORDER — LIDOCAINE HYDROCHLORIDE 20 MG/ML
INJECTION, SOLUTION INFILTRATION; PERINEURAL AS NEEDED
Status: DISCONTINUED | OUTPATIENT
Start: 2021-08-10 | End: 2021-08-10 | Stop reason: SURG

## 2021-08-10 RX ADMIN — LIDOCAINE HYDROCHLORIDE 100 MG: 20 INJECTION, SOLUTION INFILTRATION; PERINEURAL at 08:00

## 2021-08-10 RX ADMIN — PROPOFOL 100 MG: 10 INJECTION, EMULSION INTRAVENOUS at 08:00

## 2021-08-10 RX ADMIN — PROPOFOL 200 MCG/KG/MIN: 10 INJECTION, EMULSION INTRAVENOUS at 08:00

## 2021-08-10 RX ADMIN — SODIUM CHLORIDE, POTASSIUM CHLORIDE, SODIUM LACTATE AND CALCIUM CHLORIDE 30 ML/HR: 600; 310; 30; 20 INJECTION, SOLUTION INTRAVENOUS at 07:03

## 2021-08-10 NOTE — H&P
ScreeningPre Procedure History & Physical    Chief Complaint:   Screening     Subjective     HPI:   Screening     Past Medical History:   Past Medical History:   Diagnosis Date   • GERD (gastroesophageal reflux disease)    • Hypertension    • Sleep apnea     does not use cpap        Past Surgical History:  Past Surgical History:   Procedure Laterality Date   • COLONOSCOPY  2016   • ENDOSCOPY  2016       Family History:  Family History   Problem Relation Age of Onset   • Stomach cancer Mother    • Malig Hyperthermia Neg Hx        Social History:   reports that he has never smoked. He has quit using smokeless tobacco.  His smokeless tobacco use included chew. He reports that he does not drink alcohol and does not use drugs.    Medications:   Medications Prior to Admission   Medication Sig Dispense Refill Last Dose   • amLODIPine (NORVASC) 5 MG tablet Take 1 tablet by mouth Daily for 90 days. 90 tablet 0 8/10/2021 at 0430   • aspirin 325 MG tablet Take 325 mg by mouth Daily.   8/8/2021 at 0800   • benazepril (LOTENSIN) 20 MG tablet Take 1 tablet by mouth Daily for 90 days. 90 tablet 0 8/9/2021 at Unknown time   • hydroCHLOROthiazide (HYDRODIURIL) 25 MG tablet Take 0.5 tablets by mouth Daily for 90 days. 45 tablet 0 8/9/2021 at Unknown time   • multivitamin with minerals tablet tablet Take 1 tablet by mouth Daily.   Past Week at Unknown time   • Omega-3 Fatty Acids (fish oil) 1000 MG capsule capsule Take 1,000 mg by mouth Daily.   Past Week at Unknown time   • Omeprazole (PRILOSEC PO) Take  by mouth Daily.   8/9/2021 at Unknown time   • sodium-potassium-magnesium sulfates (Suprep Bowel Prep Kit) 17.5-3.13-1.6 GM/177ML solution oral solution Take 2 bottles by mouth Every 12 (Twelve) Hours for 1 day. 177 mL 0 8/10/2021 at Unknown time   • amLODIPine (NORVASC) 5 MG tablet Take 1 tablet by mouth once daily 90 tablet 0        Allergies:  Patient has no known allergies.        Objective     Blood pressure 131/94, pulse 62,  "temperature 97.5 °F (36.4 °C), temperature source Temporal, resp. rate 18, height 185.4 cm (72.99\"), weight 106 kg (232 lb 12.9 oz), SpO2 97 %.    Physical Exam   Constitutional: Pt is oriented to person, place, and time and well-developed, well-nourished, and in no distress.   Mouth/Throat: Oropharynx is clear and moist.   Neck: Normal range of motion.   Cardiovascular: Normal rate, regular rhythm and normal heart sounds.    Pulmonary/Chest: Effort normal and breath sounds normal.   Abdominal: Soft. Nontender  Skin: Skin is warm and dry.   Psychiatric: Mood, memory, affect and judgment normal.     Assessment/Plan     Diagnosis:  Screening colonoscopy  H/o colon polyps     Anticipated Surgical Procedure:  colonoscopy    The risks, benefits, and alternatives of this procedure have been discussed with the patient or the responsible party- the patient understands and agrees to proceed.            "

## 2021-08-10 NOTE — ANESTHESIA PREPROCEDURE EVALUATION
Anesthesia Evaluation     Patient summary reviewed and Nursing notes reviewed                Airway   Mallampati: I  TM distance: >3 FB  Neck ROM: full  No difficulty expected  Dental      Pulmonary - normal exam    breath sounds clear to auscultation  (+) sleep apnea,   Cardiovascular - normal exam    Rhythm: regular    (+) hypertension,       Neuro/Psych- negative ROS  GI/Hepatic/Renal/Endo    (+) obesity,  GERD,      Musculoskeletal (-) negative ROS    Abdominal    Substance History - negative use     OB/GYN negative ob/gyn ROS         Other                        Anesthesia Plan    ASA 3     general     intravenous induction     Anesthetic plan, all risks, benefits, and alternatives have been provided, discussed and informed consent has been obtained with: patient.

## 2021-08-10 NOTE — ANESTHESIA POSTPROCEDURE EVALUATION
Patient: Filemon Lazaro    Procedure Summary     Date: 08/10/21 Room / Location: [facility] ENDOSCOPY 3 / [facility] ENDOSCOPY    Anesthesia Start: 0758 Anesthesia Stop: 0821    Procedure: COLONOSCOPY (N/A ) Diagnosis: (SCREENING)    Surgeons: Mat Posada MD Provider: Serafin Medellin MD    Anesthesia Type: general ASA Status: 3          Anesthesia Type: general    Vitals  Vitals Value Taken Time   /100 08/10/21 0840   Temp 36.3 °C (97.3 °F) 08/10/21 0820   Pulse 58 08/10/21 0841   Resp 18 08/10/21 0820   SpO2 96 % 08/10/21 0841   Vitals shown include unvalidated device data.        Post Anesthesia Care and Evaluation    Patient location during evaluation: bedside  Patient participation: complete - patient participated  Level of consciousness: awake  Pain management: adequate  Airway patency: patent  Anesthetic complications: No anesthetic complications  PONV Status: none  Cardiovascular status: acceptable  Respiratory status: acceptable  Hydration status: acceptable    Comments: An Anesthesiologist personally participated in the most demanding procedures (including induction and emergence if applicable) in the anesthesia plan, monitored the course of anesthesia administration at frequent intervals and remained physically present and available for immediate diagnosis and treatment of emergencies.

## 2021-09-07 DIAGNOSIS — I10 ESSENTIAL HYPERTENSION: ICD-10-CM

## 2021-09-07 NOTE — TELEPHONE ENCOUNTER
Caller: Harjitbrett Filemon HERRERA    Relationship: Self    Best call back number: [phone]    Medication needed:   Requested Prescriptions     Pending Prescriptions Disp Refills   • amLODIPine (NORVASC) 5 MG tablet 90 tablet 0     Sig: Take 1 tablet by mouth Daily for 90 days.   • benazepril (LOTENSIN) 20 MG tablet 90 tablet 0     Sig: Take 1 tablet by mouth Daily for 90 days.     When do you need the refill by: ASAP    What additional details did the patient provide when requesting the medication: PATIENT HAS AN APPT SCHEDULED FOR 9/13/21. HE WILL RUN OUT OF MEDICATION ON Thursday 9/9/21. HE WOULD LIKE TO KNOW IF HE CAN GET A PARTIAL REFILL UNTIL APPT.    Does the patient have less than a 3 day supply:  [x] Yes  [] No    What is the patient's preferred pharmacy: [facility] [address] [place] [address] [address] [facility] [phone] [facility] [phone] FX

## 2021-09-10 RX ORDER — AMLODIPINE BESYLATE 5 MG/1
5 TABLET ORAL DAILY
Qty: 90 TABLET | Refills: 0 | Status: SHIPPED | OUTPATIENT
Start: 2021-09-10 | End: 2021-09-13 | Stop reason: SDUPTHER

## 2021-09-10 RX ORDER — BENAZEPRIL HYDROCHLORIDE 20 MG/1
20 TABLET ORAL DAILY
Qty: 90 TABLET | Refills: 0 | Status: SHIPPED | OUTPATIENT
Start: 2021-09-10 | End: 2021-09-13 | Stop reason: SDUPTHER

## 2021-09-10 NOTE — TELEPHONE ENCOUNTER
Caller: Filemon Lazaro    Relationship: Self    Best call back number: [phone]    Who are you requesting to speak with (clinical staff, provider,  specific staff member): MEDICAL STAFF    What was the call regarding: PATIENT IS FULLY OUT OF benazepril (LOTENSIN) 20 MG tablet. PATIENT HAS AN APPT ON 9/13/21. ATTEMPTED TO WARM TRANSFER. PLEASE CALL PATIENT TO ADVISE.

## 2021-09-13 ENCOUNTER — OFFICE VISIT (OUTPATIENT)
Dept: FAMILY MEDICINE CLINIC | Age: 61
End: 2021-09-13

## 2021-09-13 ENCOUNTER — LAB (OUTPATIENT)
Dept: LAB | Facility: HOSPITAL | Age: 61
End: 2021-09-13

## 2021-09-13 VITALS
SYSTOLIC BLOOD PRESSURE: 137 MMHG | BODY MASS INDEX: 31.64 KG/M2 | DIASTOLIC BLOOD PRESSURE: 76 MMHG | HEART RATE: 67 BPM | WEIGHT: 239.8 LBS

## 2021-09-13 DIAGNOSIS — I10 ESSENTIAL HYPERTENSION: Primary | ICD-10-CM

## 2021-09-13 DIAGNOSIS — I10 ESSENTIAL HYPERTENSION: ICD-10-CM

## 2021-09-13 LAB
ALBUMIN SERPL-MCNC: 4.1 G/DL (ref 3.5–5.2)
ALBUMIN/GLOB SERPL: 2 G/DL
ALP SERPL-CCNC: 68 U/L (ref 39–117)
ALT SERPL W P-5'-P-CCNC: 22 U/L (ref 1–41)
ANION GAP SERPL CALCULATED.3IONS-SCNC: 9.6 MMOL/L (ref 5–15)
AST SERPL-CCNC: 19 U/L (ref 1–40)
BILIRUB SERPL-MCNC: 0.9 MG/DL (ref 0–1.2)
BUN SERPL-MCNC: 13 MG/DL (ref 6–20)
BUN/CREAT SERPL: 12 (ref 7–25)
CALCIUM SPEC-SCNC: 9 MG/DL (ref 8.6–10.5)
CHLORIDE SERPL-SCNC: 100 MMOL/L (ref 98–107)
CO2 SERPL-SCNC: 28.4 MMOL/L (ref 22–29)
CREAT SERPL-MCNC: 1.08 MG/DL (ref 0.76–1.27)
GFR SERPL CREATININE-BSD FRML MDRD: 70 ML/MIN/1.73
GLOBULIN UR ELPH-MCNC: 2.1 GM/DL
GLUCOSE SERPL-MCNC: 80 MG/DL (ref 65–99)
POTASSIUM SERPL-SCNC: 4.2 MMOL/L (ref 3.5–5.2)
PROT SERPL-MCNC: 6.2 G/DL (ref 6–8.5)
SODIUM SERPL-SCNC: 138 MMOL/L (ref 136–145)

## 2021-09-13 PROCEDURE — 99213 OFFICE O/P EST LOW 20 MIN: CPT | Performed by: NURSE PRACTITIONER

## 2021-09-13 PROCEDURE — 80053 COMPREHEN METABOLIC PANEL: CPT

## 2021-09-13 PROCEDURE — 36415 COLL VENOUS BLD VENIPUNCTURE: CPT

## 2021-09-13 RX ORDER — AMLODIPINE BESYLATE 5 MG/1
5 TABLET ORAL DAILY
Qty: 90 TABLET | Refills: 3 | Status: SHIPPED | OUTPATIENT
Start: 2021-09-13 | End: 2021-12-12

## 2021-09-13 RX ORDER — BENAZEPRIL HYDROCHLORIDE 20 MG/1
20 TABLET ORAL DAILY
Qty: 90 TABLET | Refills: 3 | Status: SHIPPED | OUTPATIENT
Start: 2021-09-13 | End: 2021-12-08

## 2021-09-13 RX ORDER — HYDROCHLOROTHIAZIDE 25 MG/1
12.5 TABLET ORAL DAILY
Qty: 45 TABLET | Refills: 3 | Status: SHIPPED | OUTPATIENT
Start: 2021-09-13 | End: 2022-10-04 | Stop reason: SDUPTHER

## 2021-09-13 NOTE — PROGRESS NOTES
Filemon Lazaro presents to [facility].    Chief Complaint:  Hypertension         History of Present Illness:  Hypertension:  Current medication HCTZ, lotensin and Norvasc   Tolerating Medication: Yes  Checking BP at home and it is 120's /70's   Needs refills: Yes/ [facility]   Labs   Lab Results       Component                Value               Date                       BUN                      18                  04/16/2021                 CREATININE               1.11                04/16/2021                 BCR                      16                  04/16/2021                 K                        4.2                 04/16/2021                 CO2                      28                  04/16/2021                 CALCIUM                  9.0                 04/16/2021                 ALBUMIN                  4.0                 04/16/2021                 LABIL2                   1.8                 04/16/2021                 AST                      22                  04/16/2021                 ALT                      21                  04/16/2021              PMH changes since 3-2021: colonoscopy 8-2021 normal   He did have covid 1-2021, had his first vaccine, second due tomorrow         Review of Systems:  Review of Systems   Constitutional: Negative for fatigue and fever.   Respiratory: Negative for cough and shortness of breath.    Cardiovascular: Negative for chest pain, palpitations and leg swelling.   Neurological: Negative for numbness.          Vital Signs:   /76 (BP Location: Right arm, Patient Position: Sitting)   Pulse 67   Wt 109 kg (239 lb 12.8 oz)   BMI 31.64 kg/m²       Physical Exam:  Physical Exam  Vitals reviewed.   Constitutional:       General: He is not in acute distress.     Appearance: Normal appearance.   Neck:      Vascular: No carotid bruit.   Cardiovascular:      Rate and Rhythm: Normal rate and regular rhythm.      Heart sounds:  Normal heart sounds. No murmur heard.     Pulmonary:      Effort: Pulmonary effort is normal. No respiratory distress.      Breath sounds: Normal breath sounds.   Musculoskeletal:      Right lower leg: No edema.      Left lower leg: No edema.   Neurological:      Mental Status: He is alert.   Psychiatric:         Mood and Affect: Mood normal.         Behavior: Behavior normal.         Result Review      The following data was reviewed by: FRANCO Doran on 09/13/2021:    Results for orders placed or performed during the hospital encounter of 04/16/21   Lipid Panel   Result Value Ref Range    Glucose 92 70 - 99 mg/dL    BUN 18 5 - 25 mg/dL    Creatinine 1.11 0.70 - 1.20 mg/dL    BUN/Creatinine Ratio 16 6 - 20 [ratio]    GFR >60 >60 mL/min/[1.73_m2]    Sodium 135 135 - 147 mmol/L    Potassium 4.2 3.5 - 5.3 mmol/L    Chloride 99 99 - 111 mmol/L    CO2 28 22 - 32 mmol/L    Anion Gap 12 8 - 19 mmol/L    OSMOLALITY CALC 282 273 - 304    Total Protein 6.2 (L) 6.3 - 8.2 g/dL    Albumin 4.0 3.5 - 5.0 g/dL    Globulin 2.2 2.0 - 3.5 g/dL    A/G Ratio 1.8 1.4 - 2.6 [ratio]    Calcium 9.0 8.7 - 10.4 mg/dL    Alkaline Phosphatase 72 56 - 119 U/L    ALT (SGPT) 21 10 - 40 U/L    AST (SGOT) 22 15 - 50 U/L    Total Bilirubin 1.07 0.20 - 1.30 mg/dL    Triglycerides 100 40 - 150 mg/dL    Total Cholesterol 166 107 - 200 mg/dL    HDL Cholesterol 44 40 - 60 mg/dL    Chol/HDL Ratio 3.8 3.0 - 6.0 NA    LDL Cholesterol  102 (H) 70 - 100 mg/dL    VLDL Cholesterol 20 5 - 37 mg/dL               Assessment and Plan:          Diagnoses and all orders for this visit:    1. Essential hypertension (Primary)  Assessment & Plan:  Hypertension is stable. Continue to monitor BP at home. Continue current meds. Continue to modify diet and lifestyle. Wants to come every year, discussed his last labs, his weight is up, he is going to work on diet and exercise. Advised to get his second covid vaccine tomorrow       Orders:  -     Comprehensive  Metabolic Panel; Future  -     hydroCHLOROthiazide (HYDRODIURIL) 25 MG tablet; Take 0.5 tablets by mouth Daily for 90 days.  Dispense: 45 tablet; Refill: 3  -     benazepril (LOTENSIN) 20 MG tablet; Take 1 tablet by mouth Daily for 90 days.  Dispense: 90 tablet; Refill: 3  -     amLODIPine (NORVASC) 5 MG tablet; Take 1 tablet by mouth Daily for 90 days.  Dispense: 90 tablet; Refill: 3        Follow Up   Return for followup pending lab results.  Patient was given instructions and counseling regarding his condition or for health maintenance advice. Please see specific information pulled into the AVS if appropriate.

## 2021-09-13 NOTE — ASSESSMENT & PLAN NOTE
Hypertension is stable. Continue to monitor BP at home. Continue current meds. Continue to modify diet and lifestyle. Wants to come every year, discussed his last labs, his weight is up, he is going to work on diet and exercise. Advised to get his second covid vaccine tomorrow

## 2021-12-07 DIAGNOSIS — I10 ESSENTIAL HYPERTENSION: ICD-10-CM

## 2021-12-08 RX ORDER — BENAZEPRIL HYDROCHLORIDE 20 MG/1
TABLET ORAL
Qty: 90 TABLET | Refills: 0 | Status: SHIPPED | OUTPATIENT
Start: 2021-12-08 | End: 2022-03-14

## 2022-03-07 DIAGNOSIS — I10 ESSENTIAL HYPERTENSION: ICD-10-CM

## 2022-03-07 RX ORDER — AMLODIPINE BESYLATE 5 MG/1
TABLET ORAL
Qty: 90 TABLET | Refills: 0 | OUTPATIENT
Start: 2022-03-07

## 2022-03-07 RX ORDER — BENAZEPRIL HYDROCHLORIDE 20 MG/1
TABLET ORAL
Qty: 90 TABLET | Refills: 0 | OUTPATIENT
Start: 2022-03-07

## 2022-03-13 DIAGNOSIS — I10 ESSENTIAL HYPERTENSION: ICD-10-CM

## 2022-03-14 ENCOUNTER — TELEPHONE (OUTPATIENT)
Dept: FAMILY MEDICINE CLINIC | Age: 62
End: 2022-03-14

## 2022-03-14 RX ORDER — BENAZEPRIL HYDROCHLORIDE 20 MG/1
20 TABLET ORAL DAILY
Qty: 30 TABLET | Refills: 0 | Status: SHIPPED | OUTPATIENT
Start: 2022-03-14 | End: 2022-03-21 | Stop reason: SDUPTHER

## 2022-03-14 RX ORDER — AMLODIPINE BESYLATE 5 MG/1
5 TABLET ORAL DAILY
Qty: 30 TABLET | Refills: 0 | Status: SHIPPED | OUTPATIENT
Start: 2022-03-14 | End: 2022-03-21 | Stop reason: SDUPTHER

## 2022-03-14 NOTE — TELEPHONE ENCOUNTER
Caller: Filemon Lazaro    Relationship: Self    Best call back number: [phone]    What is the best time to reach you: ANYTIME    Who are you requesting to speak with (clinical staff, provider,  specific staff member): CLINICAL    Do you know the name of the person who called: MARY    What was the call regarding: PATIENT STATES TWO OF HIS MEDICATIONS NEED REFILLS, BUT HE FEELS HE WAS IN JUST A COUPLE OF MONTHS AGO AND SHOULDN'T NEED TO COME IN FIRST. PLEASE CALL AND ADVISE PATIENT. PATIENT STATES HE IS OUT OF THE TWO MEDICATIONS HE NEEDS.    Do you require a callback: YES

## 2022-03-14 NOTE — TELEPHONE ENCOUNTER
Rx Refill Note  Requested Prescriptions     Pending Prescriptions Disp Refills   • amLODIPine (NORVASC) 5 MG tablet [Pharmacy Med Name: amLODIPine Besylate 5 MG Oral Tablet] 90 tablet 0     Sig: Take 1 tablet by mouth once daily   • benazepril (LOTENSIN) 20 MG tablet [Pharmacy Med Name: Benazepril HCl 20 MG Oral Tablet] 90 tablet 0     Sig: Take 1 tablet by mouth once daily      Last office visit with prescribing clinician: 9/13/2021      Next office visit with prescribing clinician: Visit date not found  Called pt to set up his 6 month f/u appt. He said he had talked to A Gonzalez and she approved to just see him once a year.  Dx: hypertension.     Nanci Nunez LPN  03/14/22, 12:11 EDT

## 2022-03-21 DIAGNOSIS — I10 ESSENTIAL HYPERTENSION: ICD-10-CM

## 2022-03-21 RX ORDER — BENAZEPRIL HYDROCHLORIDE 20 MG/1
20 TABLET ORAL DAILY
Qty: 90 TABLET | Refills: 1 | Status: SHIPPED | OUTPATIENT
Start: 2022-03-21 | End: 2022-10-04 | Stop reason: SDUPTHER

## 2022-03-21 RX ORDER — AMLODIPINE BESYLATE 5 MG/1
5 TABLET ORAL DAILY
Qty: 90 TABLET | Refills: 1 | Status: SHIPPED | OUTPATIENT
Start: 2022-03-21 | End: 2022-10-04 | Stop reason: SDUPTHER

## 2022-03-21 NOTE — TELEPHONE ENCOUNTER
Rx Refill Note  Requested Prescriptions     Pending Prescriptions Disp Refills   • amLODIPine (NORVASC) 5 MG tablet 90 tablet 1     Sig: Take 1 tablet by mouth Daily. DUE FOR OFFICE VISIT FOR REFILLS   • benazepril (LOTENSIN) 20 MG tablet 90 tablet 1     Sig: Take 1 tablet by mouth Daily. DUE FOR OFFICE VISIT FOR REFILLS      Last office visit with prescribing clinician: 9/13/2021      Next office visit with prescribing clinician: Visit date not found  A Gonzalez approved for him to only come in once a year for appts with her.     Nanci Nunez LPN  03/21/22, 15:33 EDT

## 2022-10-04 ENCOUNTER — OFFICE VISIT (OUTPATIENT)
Dept: FAMILY MEDICINE CLINIC | Age: 62
End: 2022-10-04

## 2022-10-04 VITALS
OXYGEN SATURATION: 97 % | DIASTOLIC BLOOD PRESSURE: 79 MMHG | HEIGHT: 73 IN | SYSTOLIC BLOOD PRESSURE: 149 MMHG | WEIGHT: 235.4 LBS | HEART RATE: 58 BPM | BODY MASS INDEX: 31.2 KG/M2

## 2022-10-04 DIAGNOSIS — I10 ESSENTIAL HYPERTENSION: Primary | ICD-10-CM

## 2022-10-04 DIAGNOSIS — Z11.59 SCREENING FOR VIRAL DISEASE: ICD-10-CM

## 2022-10-04 DIAGNOSIS — G47.30 SLEEP APNEA, UNSPECIFIED TYPE: ICD-10-CM

## 2022-10-04 PROCEDURE — 99213 OFFICE O/P EST LOW 20 MIN: CPT | Performed by: NURSE PRACTITIONER

## 2022-10-04 RX ORDER — AMLODIPINE BESYLATE 5 MG/1
5 TABLET ORAL DAILY
Qty: 90 TABLET | Refills: 0 | Status: SHIPPED | OUTPATIENT
Start: 2022-10-04 | End: 2023-01-09

## 2022-10-04 RX ORDER — CLOBETASOL PROPIONATE 0.5 MG/G
CREAM TOPICAL
COMMUNITY
Start: 2022-09-19

## 2022-10-04 RX ORDER — HYDROCHLOROTHIAZIDE 25 MG/1
12.5 TABLET ORAL DAILY
Qty: 45 TABLET | Refills: 0 | Status: SHIPPED | OUTPATIENT
Start: 2022-10-04 | End: 2023-01-10

## 2022-10-04 RX ORDER — MOMETASONE FUROATE 1 MG/G
CREAM TOPICAL
COMMUNITY
Start: 2022-09-21

## 2022-10-04 RX ORDER — BENAZEPRIL HYDROCHLORIDE 20 MG/1
20 TABLET ORAL DAILY
Qty: 90 TABLET | Refills: 0 | Status: SHIPPED | OUTPATIENT
Start: 2022-10-04 | End: 2023-01-09

## 2022-10-04 NOTE — ASSESSMENT & PLAN NOTE
Hypertension is stable. advised to monitor BP at home. Continue current meds. Continue to modify diet and lifestyle.   Advised flu vaccine, he will get at work, declines covid vaccine booster  Will return for fasting for his labs

## 2022-10-04 NOTE — PROGRESS NOTES
Filemon Lazaro presents to [facility].    Chief Complaint:  Med Refill         History of Present Illness:  Hypertension:  Current medication: Norvasc, Lotensin, HCTZ   Tolerating Medication: Yes  Checking BP at home and it is: 140's / 80's   Needs refills: Yes to [facility]   Labs:  Lab Results       Component                Value               Date                       GLUCOSE                  80                  2021                 BUN                      13                  2021                 CREATININE               1.08                2021                 EGFRIFNONA               70                  2021                 BCR                      12.0                2021                 K                        4.2                 2021                 CO2                      28.4                2021                 CALCIUM                  9.0                 2021                 ALBUMIN                  4.10                2021                 LABIL2                   1.8                 2021                 AST                      19                  2021                 ALT                      22                  2021                PAST MEDICAL HISTORY changes since           Hypertension     Sleep Apnea: dx'd in ; but never followed up       + Covid          Surgical History:         Vasectomy     Procedures: EGD 2017     COLONOSCOPY: was last done   normal, next one due , Dr Posada,  & 2016 with the following abnormalaties noted-- hemorrhoids and diverticuli         Family History:     Father:  at age 75; Cause of death was COPD;  Hypertension;    Mother:  age 84, uterine cancer     Brother(s): Healthy; 1 brother(s) total     Sister(s): Healthy; 1 sister(s) total     Paternal Grandfather:  at age 72;  Hypertension     Paternal Grandmother:   "at age 70's; Cause of death was stroke;  Hypertension     Maternal Grandfather:  at age 81; Cause of death was COPD     Maternal Grandmother:  at age 95; Cause of death was pneumonia         Social History:     Occupation: [employer] technician     Marital Status:      Children: 1 child         Review of Systems:  Review of Systems   Constitutional: Negative for fatigue and fever.   Respiratory: Negative for cough and shortness of breath.    Cardiovascular: Negative for chest pain, palpitations and leg swelling.   Neurological: Negative for numbness.          Current Outpatient Medications:   •  amLODIPine (NORVASC) 5 MG tablet, Take 1 tablet by mouth Daily., Disp: 90 tablet, Rfl: 0  •  aspirin 325 MG tablet, Take 325 mg by mouth Daily., Disp: , Rfl:   •  benazepril (LOTENSIN) 20 MG tablet, Take 1 tablet by mouth Daily., Disp: 90 tablet, Rfl: 0  •  clobetasol (TEMOVATE) 0.05 % cream, APPLY TOPICALLY TO AFFECTED AREA(S) TWICE DAILY AS NEEDED FOR FLARES FOR 2 WEEKS. AVOID USE ON FACE OR GROIN, Disp: , Rfl:   •  hydroCHLOROthiazide (HYDRODIURIL) 25 MG tablet, Take 0.5 tablets by mouth Daily for 90 days., Disp: 45 tablet, Rfl: 0  •  mometasone (ELOCON) 0.1 % cream, APPLY TOPICALLY TO AFFECTED AREA(S) TWICE A DAY AS NEEDED FOR FLARES, Disp: , Rfl:   •  multivitamin with minerals tablet tablet, Take 1 tablet by mouth Daily., Disp: , Rfl:   •  Omega-3 Fatty Acids (fish oil) 1000 MG capsule capsule, Take 1,000 mg by mouth Daily., Disp: , Rfl:   •  Omeprazole (PRILOSEC PO), Take  by mouth Daily., Disp: , Rfl:     Vital Signs:   Vitals:    10/04/22 0909 10/04/22 0938   BP: 154/90 149/79   BP Location: Right arm Left arm   Patient Position: Sitting Sitting   Pulse: 65 58   SpO2: 97%    Weight: 107 kg (235 lb 6.4 oz)    Height: 185.4 cm (72.99\")          Physical Exam:  Physical Exam  Vitals reviewed.   Constitutional:       General: He is not in acute distress.     Appearance: Normal " appearance.   Neck:      Vascular: No carotid bruit.   Cardiovascular:      Rate and Rhythm: Normal rate and regular rhythm.      Heart sounds: Normal heart sounds. No murmur heard.  Pulmonary:      Effort: Pulmonary effort is normal. No respiratory distress.      Breath sounds: Normal breath sounds.   Musculoskeletal:      Right lower leg: No edema.      Left lower leg: No edema.   Neurological:      Mental Status: He is alert.   Psychiatric:         Mood and Affect: Mood normal.         Behavior: Behavior normal.         Result Review      The following data was reviewed by: FRANCO Doran on 10/04/2022:    Results for orders placed or performed in visit on 09/13/21   Comprehensive Metabolic Panel    Specimen: Blood   Result Value Ref Range    Glucose 80 65 - 99 mg/dL    BUN 13 6 - 20 mg/dL    Creatinine 1.08 0.76 - 1.27 mg/dL    Sodium 138 136 - 145 mmol/L    Potassium 4.2 3.5 - 5.2 mmol/L    Chloride 100 98 - 107 mmol/L    CO2 28.4 22.0 - 29.0 mmol/L    Calcium 9.0 8.6 - 10.5 mg/dL    Total Protein 6.2 6.0 - 8.5 g/dL    Albumin 4.10 3.50 - 5.20 g/dL    ALT (SGPT) 22 1 - 41 U/L    AST (SGOT) 19 1 - 40 U/L    Alkaline Phosphatase 68 39 - 117 U/L    Total Bilirubin 0.9 0.0 - 1.2 mg/dL    eGFR Non African Amer 70 >60 mL/min/1.73    Globulin 2.1 gm/dL    A/G Ratio 2.0 g/dL    BUN/Creatinine Ratio 12.0 7.0 - 25.0    Anion Gap 9.6 5.0 - 15.0 mmol/L               Assessment and Plan:          Diagnoses and all orders for this visit:    1. Essential hypertension (Primary)  Assessment & Plan:  Hypertension is stable. advised to monitor BP at home. Continue current meds. Continue to modify diet and lifestyle.   Advised flu vaccine, he will get at work, declines covid vaccine booster  Will return for fasting for his labs     Orders:  -     Cancel: Comprehensive Metabolic Panel  -     Cancel: Lipid Panel  -     Comprehensive Metabolic Panel; Future  -     Lipid Panel; Future  -     amLODIPine (NORVASC) 5 MG  tablet; Take 1 tablet by mouth Daily.  Dispense: 90 tablet; Refill: 0  -     benazepril (LOTENSIN) 20 MG tablet; Take 1 tablet by mouth Daily.  Dispense: 90 tablet; Refill: 0  -     hydroCHLOROthiazide (HYDRODIURIL) 25 MG tablet; Take 0.5 tablets by mouth Daily for 90 days.  Dispense: 45 tablet; Refill: 0    2. Sleep apnea, unspecified type  Assessment & Plan:  To send back for sleep apena evaluation     Orders:  -     Ambulatory Referral to Sleep Medicine    3. Screening for viral disease  -     Hepatitis C antibody; Future        Follow Up   Return for fasting for labs.  Patient was given instructions and counseling regarding his condition or for health maintenance advice. Please see specific information pulled into the AVS if appropriate.

## 2022-10-06 ENCOUNTER — LAB (OUTPATIENT)
Dept: LAB | Facility: HOSPITAL | Age: 62
End: 2022-10-06

## 2022-10-06 DIAGNOSIS — Z11.59 SCREENING FOR VIRAL DISEASE: ICD-10-CM

## 2022-10-06 DIAGNOSIS — I10 ESSENTIAL HYPERTENSION: ICD-10-CM

## 2022-10-06 LAB
ALBUMIN SERPL-MCNC: 4.3 G/DL (ref 3.5–5.2)
ALBUMIN/GLOB SERPL: 2.2 G/DL
ALP SERPL-CCNC: 74 U/L (ref 39–117)
ALT SERPL W P-5'-P-CCNC: 21 U/L (ref 1–41)
ANION GAP SERPL CALCULATED.3IONS-SCNC: 9 MMOL/L (ref 5–15)
AST SERPL-CCNC: 20 U/L (ref 1–40)
BILIRUB SERPL-MCNC: 1 MG/DL (ref 0–1.2)
BUN SERPL-MCNC: 14 MG/DL (ref 8–23)
BUN/CREAT SERPL: 14.3 (ref 7–25)
CALCIUM SPEC-SCNC: 9.5 MG/DL (ref 8.6–10.5)
CHLORIDE SERPL-SCNC: 99 MMOL/L (ref 98–107)
CHOLEST SERPL-MCNC: 186 MG/DL (ref 0–200)
CO2 SERPL-SCNC: 29 MMOL/L (ref 22–29)
CREAT SERPL-MCNC: 0.98 MG/DL (ref 0.76–1.27)
EGFRCR SERPLBLD CKD-EPI 2021: 88.3 ML/MIN/1.73
GLOBULIN UR ELPH-MCNC: 2 GM/DL
GLUCOSE SERPL-MCNC: 100 MG/DL (ref 65–99)
HCV AB SER DONR QL: NORMAL
HDLC SERPL-MCNC: 42 MG/DL (ref 40–60)
LDLC SERPL CALC-MCNC: 122 MG/DL (ref 0–100)
LDLC/HDLC SERPL: 2.86 {RATIO}
POTASSIUM SERPL-SCNC: 4.5 MMOL/L (ref 3.5–5.2)
PROT SERPL-MCNC: 6.3 G/DL (ref 6–8.5)
SODIUM SERPL-SCNC: 137 MMOL/L (ref 136–145)
TRIGL SERPL-MCNC: 119 MG/DL (ref 0–150)
VLDLC SERPL-MCNC: 22 MG/DL (ref 5–40)

## 2022-10-06 PROCEDURE — 80053 COMPREHEN METABOLIC PANEL: CPT

## 2022-10-06 PROCEDURE — 86803 HEPATITIS C AB TEST: CPT

## 2022-10-06 PROCEDURE — 80061 LIPID PANEL: CPT

## 2022-10-06 PROCEDURE — 36415 COLL VENOUS BLD VENIPUNCTURE: CPT

## 2022-10-20 ENCOUNTER — TELEPHONE (OUTPATIENT)
Dept: FAMILY MEDICINE CLINIC | Age: 62
End: 2022-10-20

## 2022-10-20 NOTE — TELEPHONE ENCOUNTER
Caller: Filemon Lazaro    Relationship: Self    Best call back number: [phone]    What form or medical record are you requesting: PAST SLEEP NOTES/SLEEP STUDY, ALL PAPERWORK RELATED TO SLEEP EVALUATION (FLAGET)    Who is requesting this form or medical record from you: DR. ALVARADO    How would you like to receive the form or medical records (pick-up, mail, fax): PICKUP    Timeframe paperwork needed: SOON    Additional notes: PATIENT IS REQUESTING A CALL BACK ONCE THIS HAS BEEN COMPLETED.

## 2023-01-07 DIAGNOSIS — I10 ESSENTIAL HYPERTENSION: ICD-10-CM

## 2023-01-09 ENCOUNTER — OFFICE VISIT (OUTPATIENT)
Dept: SLEEP MEDICINE | Facility: HOSPITAL | Age: 63
End: 2023-01-09
Payer: COMMERCIAL

## 2023-01-09 VITALS
HEIGHT: 73 IN | DIASTOLIC BLOOD PRESSURE: 79 MMHG | BODY MASS INDEX: 31.45 KG/M2 | OXYGEN SATURATION: 97 % | HEART RATE: 75 BPM | SYSTOLIC BLOOD PRESSURE: 140 MMHG | WEIGHT: 237.3 LBS

## 2023-01-09 DIAGNOSIS — R06.83 SNORING: ICD-10-CM

## 2023-01-09 DIAGNOSIS — G47.33 OSA (OBSTRUCTIVE SLEEP APNEA): Primary | ICD-10-CM

## 2023-01-09 PROCEDURE — 99204 OFFICE O/P NEW MOD 45 MIN: CPT | Performed by: INTERNAL MEDICINE

## 2023-01-09 PROCEDURE — G0463 HOSPITAL OUTPT CLINIC VISIT: HCPCS

## 2023-01-09 RX ORDER — BENAZEPRIL HYDROCHLORIDE 20 MG/1
TABLET ORAL
Qty: 90 TABLET | Refills: 0 | Status: SHIPPED | OUTPATIENT
Start: 2023-01-09

## 2023-01-09 RX ORDER — AMLODIPINE BESYLATE 5 MG/1
TABLET ORAL
Qty: 90 TABLET | Refills: 0 | Status: SHIPPED | OUTPATIENT
Start: 2023-01-09

## 2023-01-09 NOTE — TELEPHONE ENCOUNTER
Rx Refill Note  Requested Prescriptions     Pending Prescriptions Disp Refills   • benazepril (LOTENSIN) 20 MG tablet [Pharmacy Med Name: Benazepril HCl 20 MG Oral Tablet] 90 tablet 0     Sig: Take 1 tablet by mouth once daily   • amLODIPine (NORVASC) 5 MG tablet [Pharmacy Med Name: amLODIPine Besylate 5 MG Oral Tablet] 90 tablet 0     Sig: Take 1 tablet by mouth once daily      Last office visit with prescribing clinician: 10/4/2022   Last telemedicine visit with prescribing clinician: Visit date not found   Next office visit with prescribing clinician: Visit date not found  Comprehensive Metabolic Panel (10/06/2022 07:48)  ACE Inhibitors Protocol Failed   Calcium-Channel Blockers Protocol Failed     Nanci Nunez LPN  01/09/23, 14:19 EST

## 2023-01-09 NOTE — PROGRESS NOTES
[facility]  [address] [place], [place] [id number]  [place]   [place] [address]  Phone: [phone]  Fax: [phone]      Filemon Lazaro  [id number]   1960  62 y.o.  male      Referring physician/provider and  PCP Stacey Gonzalez APRN    Type of service: Initial Sleep Medicine Consult.  Date of service: 1/9/2023      Chief Complaint   Patient presents with   • Sleep Apnea   • Obesity   • Snoring       History of present illness;  Thank you for asking to see Filemon Lazaro, 62 y.o.. The patient was seen today on 1/9/2023 at [facility].  He was evaluated previously by Dr. Peres and and underwent a polysomnography at [facility].  I do have a copy of the test which shows patient has mild sleep apnea with AHI of 9.9/h.  Unfortunately he did not see a doctor and was told that he needs to have a titration study but he never went back because he his co-pay was $700 at that time.  Patient continues to have symptoms.  He has snoring and unrefreshed sleep.  He does has daytime excessive sleepiness.    Full night polysomnography was conducted on 22 May 2019 at [facility]    Patient gives the following sleep history.  Sleep schedule:  Bedtime: 11 PM  Wake time: 6:30 AM  Normally takes about 10 minutes to fall asleep  Average hours of sleep 8  Number of naps per day 0      MEDICAL CONDITIONS (PMH)   1. 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 Thyroid disorder    Medications: reviewed    Review of systems:  Positive symptoms are :  • Snoring  • Witnessed apnea  • Daytime excessive sleepiness with [place] Sleepiness Scale of Total score: 5   • Fatigue  •        Physical  unchanged exam:  CONSTITUTINONAL:  Vitals:    01/09/23 1300   BP: 140/79   Pulse: 75   SpO2: 97%   Weight: 108 kg (237 lb 4.8 oz)   Height: 185.4 cm (72.99\")    Body mass index is 31.32 kg/m².   NOSE:no nasal septal defects, nasal passages are clear, no nasal polyps,   THROAT: tonsils are nonenlarged, tongue normal size, oral airway Mallampati class 3  NECK:Neck Circumference: 15.5 inches, trachea is in the midline, thyroid not enlarged  RESPIRATORY SYSTEM: Breath sounds are normal, there are no wheezes  CARDIOVASULAR SYSTEM: Heart sounds are regular rhythm and normal rate, no edema  NEUROLOGICAL SYSTEM: Oriented x 3, No speech defect, gait is normal  PSYCHIATRIC SYSTEM: Mood is normal, thought content is normal      Assessment and plan:  · Obstructive sleep apnea.  I have reviewed the sleep study.  He has mild sleep apnea with AHI of 9.9/h.  I have proposed a auto CPAP and set up auto CPAP with [facility] and see me in 31 to 90 days for compliance.  · Snoring (R06.83), snoring is the sound created by turbulent airflow vibrating upper airway soft tissue due to limitation of inspiratory airflow. I have also discussed factors affecting snoring including sleep deprivation, sleeping on the back and alcohol ingestion. To minimize snoring, patient is advised to have adequate sleep, sleep on the side and avoid alcohol and sedative medications before bedtime  · Daytime excessive sleepiness .  It was assessed with [place] Sleepiness Scale of Total score: 5.  There are many causes for daytime excessive sleepiness including sleep depression, shiftwork syndrome, depression and other medical disorders including heart, kidney and liver failure.  The most serious cause of excessive sleepiness is due to neurological conditions like narcolepsy/cataplexy.  But the most common cause of excessive sleepiness is due to sleep apnea with frequent awakenings during sleep time.  I have discussed safety of driving and to remain vigilant while  driving.  · Obesity 1, patient's BMI is Body mass index is 31.32 kg/m².. I have discussed the relationship between weight and sleep apnea.There is direct correlation between weight and severity of sleep apnea.  Weight reduction is encouraged, as it is going to reduce the severity of sleep apnea. I have also discussed with the patient diet and exercise to achieve ideal body weight.    I have also discussed with the patient the following  • Sleep hygiene: Maintaining a regular bedtime and wake time, not to watch television or work in bed, limit caffeine-containing beverages before bed time and avoid naps during the day  • Adequate amount of sleep.  Generally most people needs about 7 to 8 hours of sleep.    Return for 31 to 90 days after PAP setup with down load..  Patient's questions were answered      I once again thank you for asking me to see this patient in consultation and I have forwarded my opinion and treatment plan.  Please do not hesitate to call me if you have any questions.   1/9/2023  Hardik Carr MD  Sleep Medicine  Medical Director  [facility]: [facility]

## 2023-01-10 DIAGNOSIS — I10 ESSENTIAL HYPERTENSION: ICD-10-CM

## 2023-01-10 RX ORDER — HYDROCHLOROTHIAZIDE 25 MG/1
TABLET ORAL
Qty: 45 TABLET | Refills: 0 | Status: SHIPPED | OUTPATIENT
Start: 2023-01-10

## 2023-03-22 ENCOUNTER — OFFICE VISIT (OUTPATIENT)
Dept: SLEEP MEDICINE | Facility: HOSPITAL | Age: 63
End: 2023-03-22
Payer: COMMERCIAL

## 2023-03-22 VITALS
BODY MASS INDEX: 32.18 KG/M2 | DIASTOLIC BLOOD PRESSURE: 71 MMHG | HEART RATE: 69 BPM | HEIGHT: 73 IN | WEIGHT: 242.8 LBS | OXYGEN SATURATION: 98 % | SYSTOLIC BLOOD PRESSURE: 153 MMHG

## 2023-03-22 DIAGNOSIS — G47.33 OSA ON CPAP: Primary | ICD-10-CM

## 2023-03-22 DIAGNOSIS — Z99.89 OSA ON CPAP: Primary | ICD-10-CM

## 2023-03-22 PROCEDURE — 99213 OFFICE O/P EST LOW 20 MIN: CPT | Performed by: INTERNAL MEDICINE

## 2023-03-22 PROCEDURE — G0463 HOSPITAL OUTPT CLINIC VISIT: HCPCS

## 2023-03-22 NOTE — PROGRESS NOTES
"  [address] [address]  Phone: [phone]  Fax: [phone]      SLEEP CLINIC FOLLOW UP PROGRESS NOTE.    Filemon Lazaro  [id number]   1960  62 y.o.  male      PCP: Stacey Gonzalez APRN      Date of visit: 3/22/2023    Chief Complaint   Patient presents with   • Sleep Apnea   • Obesity       HPI:  This is a 62 y.o. years old patient is here for the management of obstructive sleep apnea.  Sleep apnea is mild in severity with a AHI of 10/hr. Patient is using positive airway pressure therapy with auto CPAP and the symptoms of sleep apnea have improved significantly on the therapy. Normally patient goes to bed at 10 PM and wakes up at 630 AM .  The patient wakes up 2 time(s) during the night and has no problem going back to sleep.  Feels refreshed after waking up.  He feels a lot better and is very happy with the ResMed air sense 11 CPAP but he would like to try nasal cradle    Medications and allergies are reviewed by me and documented in the encounter.     SOCIAL (habits pertaining to sleep medicine)  • History tobacco use:No   • History of alcohol use: 0 per week  • Caffeine use: 5     REVIEW OF SYSTEMS:   Pertaining positive symptoms are:  • [place] Sleepiness Scale :Total score: 2       PHYSICAL EXAMINATION:  CONSTITUTIONAL:  Vitals:    03/22/23 1300   BP: 153/71   Pulse: 69   SpO2: 98%   Weight: 110 kg (242 lb 12.8 oz)   Height: 185.4 cm (72.99\")    Body mass index is 32.04 kg/m².   NOSE: nasal passages are clear, No deformities noted   RESP SYSTEM: Not in any respiratory distress, no chest deformities noted,   CARDIOVASULAR: No edema noted  NEURO: Oriented x 3, gait normal,  Mood and affect appeared appropriate      Data reviewed:  The Smart card downloaded on 3/22/2023 has been reviewed independently by me for compliance and discussed the data with the patient.   Compliance; 100%  More than 4 hr use, 90%  Average use of the device 5 " hours and 47-minute per night  Residual AHI: 3.2 /hr (goal < 5.0 /hr)  Mask type: Fullface mask, but patient would like to try AirFit N30 nasal  Device: ResMed  DME: [facility]      ASSESSMENT AND PLAN:  · Obstructive sleep apnea ( G 47.33).  The symptoms of sleep apnea have improved with the device and the treatment.  Patient's compliance with the device is excellent for treatment of sleep apnea.  I have independently reviewed the smart card down load and discussed with the patient the download data and encouarged the patient to continue to use the device.The residual AHI is acceptable. The device is benefiting the patient and the device is medically necessary.  Without proper control of sleep apnea and good compliance there is a increased risk for hypertension, diabetes mellitus and nonrestorative sleep with hypersomnia which can increase risk for motor vehicle accidents.  Untreated sleep apnea is also a risk factor for development of atrial fibrillation, pulmonary hypertension, insulin resistance and stroke. The patient is also instructed to get the supplies from the [facility] and and change them on a regular basis.  A prescription for supplies has been sent to the [facility].  I have also discussed the good sleep hygiene habits and adequate amount of sleep needed for good health.  Patient would like to try ResMed AirFit N30 nasal  · Obesity  1 with BMI is Body mass index is 32.04 kg/m².. I have discuss the relationship between the weight and sleep apnea. The benefit of weight loss in reducing severity of sleep apnea was discussed. Discussed diet and exercise with the patient to achieve ideal BMI.   · Return in about 1 year (around 3/22/2024) for with smart card down load. . Patient's questions were answered.    3/22/2023  Hardik Carr MD  Sleep Medicine.  Medical Director,   [facility].

## 2023-04-11 DIAGNOSIS — I10 ESSENTIAL HYPERTENSION: ICD-10-CM

## 2023-04-11 RX ORDER — AMLODIPINE BESYLATE 5 MG/1
TABLET ORAL
Qty: 90 TABLET | Refills: 0 | Status: SHIPPED | OUTPATIENT
Start: 2023-04-11

## 2023-04-11 RX ORDER — BENAZEPRIL HYDROCHLORIDE 20 MG/1
TABLET ORAL
Qty: 90 TABLET | Refills: 0 | Status: SHIPPED | OUTPATIENT
Start: 2023-04-11

## 2023-04-11 NOTE — TELEPHONE ENCOUNTER
Rx Refill Note  Requested Prescriptions     Pending Prescriptions Disp Refills   • benazepril (LOTENSIN) 20 MG tablet [Pharmacy Med Name: Benazepril HCl 20 MG Oral Tablet] 90 tablet 0     Sig: Take 1 tablet by mouth once daily   • amLODIPine (NORVASC) 5 MG tablet [Pharmacy Med Name: amLODIPine Besylate 5 MG Oral Tablet] 90 tablet 0     Sig: Take 1 tablet by mouth once daily      Last office visit with prescribing clinician: 10/4/2022       Next office visit with prescribing clinician: Visit date not found    Needs to be seen   LMTRC     {Lynn Palencia LPN  04/11/23, 09:11 EDT

## 2023-08-08 DIAGNOSIS — I10 ESSENTIAL HYPERTENSION: ICD-10-CM

## 2023-08-08 NOTE — TELEPHONE ENCOUNTER
He needs his rx but he needs to be seen, can you get this worked out and give him enough to get in here

## 2023-08-08 NOTE — TELEPHONE ENCOUNTER
Rx Refill Note  Requested Prescriptions     Pending Prescriptions Disp Refills    hydroCHLOROthiazide (HYDRODIURIL) 25 MG tablet [Pharmacy Med Name: hydroCHLOROthiazide 25 MG Oral Tablet] 15 tablet 0     Sig: Take 1/2 (one-half) tablet by mouth once daily    benazepril (LOTENSIN) 20 MG tablet [Pharmacy Med Name: Benazepril HCl 20 MG Oral Tablet] 30 tablet 0     Sig: Take 1 tablet by mouth once daily      Last office visit with prescribing clinician: 10/4/2022   Last telemedicine visit with prescribing clinician: Visit date not found   Next office visit with prescribing clinician: Visit date not found  Comprehensive Metabolic Panel (10/06/2022 07:48)    ACE Inhibitors Protocol Failed      Nanci Nunez LPN  08/08/23, 10:06 EDT

## 2023-08-09 RX ORDER — HYDROCHLOROTHIAZIDE 25 MG/1
TABLET ORAL
Qty: 15 TABLET | Refills: 0 | OUTPATIENT
Start: 2023-08-09

## 2023-08-09 RX ORDER — BENAZEPRIL HYDROCHLORIDE 20 MG/1
TABLET ORAL
Qty: 30 TABLET | Refills: 0 | OUTPATIENT
Start: 2023-08-09

## 2023-08-10 ENCOUNTER — OFFICE VISIT (OUTPATIENT)
Dept: FAMILY MEDICINE CLINIC | Age: 63
End: 2023-08-10
Payer: COMMERCIAL

## 2023-08-10 VITALS
HEIGHT: 73 IN | DIASTOLIC BLOOD PRESSURE: 85 MMHG | HEART RATE: 57 BPM | BODY MASS INDEX: 31.7 KG/M2 | WEIGHT: 239.2 LBS | SYSTOLIC BLOOD PRESSURE: 143 MMHG | OXYGEN SATURATION: 98 %

## 2023-08-10 DIAGNOSIS — I10 ESSENTIAL HYPERTENSION: Primary | ICD-10-CM

## 2023-08-10 DIAGNOSIS — Z12.5 SCREENING FOR PROSTATE CANCER: ICD-10-CM

## 2023-08-10 DIAGNOSIS — Z99.89 OSA ON CPAP: ICD-10-CM

## 2023-08-10 DIAGNOSIS — G47.33 OSA ON CPAP: ICD-10-CM

## 2023-08-10 PROCEDURE — 99213 OFFICE O/P EST LOW 20 MIN: CPT | Performed by: NURSE PRACTITIONER

## 2023-08-10 RX ORDER — BENAZEPRIL HYDROCHLORIDE 20 MG/1
20 TABLET ORAL DAILY
Qty: 90 TABLET | Refills: 1 | Status: SHIPPED | OUTPATIENT
Start: 2023-08-10

## 2023-08-10 RX ORDER — AMLODIPINE BESYLATE 5 MG/1
5 TABLET ORAL DAILY
Qty: 90 TABLET | Refills: 1 | Status: SHIPPED | OUTPATIENT
Start: 2023-08-10

## 2023-08-10 RX ORDER — HYDROCHLOROTHIAZIDE 25 MG/1
12.5 TABLET ORAL DAILY
Qty: 45 TABLET | Refills: 1 | Status: SHIPPED | OUTPATIENT
Start: 2023-08-10

## 2023-08-10 NOTE — ASSESSMENT & PLAN NOTE
Discussed PSA screening, will check a PSA with future labs, he will return for a physical for his next visit

## 2023-08-10 NOTE — PROGRESS NOTES
Chief Complaint  Follow-up and Hypertension    Subjective          Filemon Lazaro presents to [facility]    History of Present Illness  Hypertension:  Current medication: norvasc, lotensin and HCTZ   Tolerating Medication: Yes (stressed getting here on time, caused his bp to be up today )   Checking BP at home and it is: 130's over 70's   Needs refills: Yes  to [facility]   Labs:  Lab Results       Component                Value               Date                       GLUCOSE                  100 (H)             10/06/2022                 BUN                      14                  10/06/2022                 CREATININE               0.98                10/06/2022                 EGFRIFNONA               70                  2021                 BCR                      14.3                10/06/2022                 K                        4.5                 10/06/2022                 CO2                      29.0                10/06/2022                 CALCIUM                  9.5                 10/06/2022                 ALBUMIN                  4.30                10/06/2022                 LABIL2                   1.8                 2021                 AST                      20                  10/06/2022                 ALT                      21                  10/06/2022              PAST MEDICAL HISTORY changes since :            Hypertension     Sleep Apnea: dx'd in ; now using cpap regularly         + Covid          Surgical History:         Vasectomy     Procedures: EGD 2017     COLONOSCOPY: was last done   normal, next one due , Dr Posada,  & 2016 with the following abnormalaties noted-- hemorrhoids and diverticuli          Family History:     Father:  at age 75; Cause of death was COPD;  Hypertension;    Mother:  age 84, uterine cancer     Brother(s): Healthy; 1 brother(s) total     Sister(s):  Healthy; 1 sister(s) total     Paternal Grandfather:  at age 72;  Hypertension     Paternal Grandmother:  at age 70's; Cause of death was stroke;  Hypertension     Maternal Grandfather:  at age 81; Cause of death was COPD     Maternal Grandmother:  at age 95; Cause of death was pneumonia         Social History:     Occupation: [employer] technician     Marital Status:      Children: 1 child                  Past Medical History:   Diagnosis Date    GERD (gastroesophageal reflux disease)     Hypertension     Sleep apnea     does not use cpap    He does use Cpap now     No Known Allergies     Past Surgical History:   Procedure Laterality Date    COLONOSCOPY      COLONOSCOPY N/A 8/10/2021    Procedure: COLONOSCOPY;  Surgeon: Mat Posada MD;  Location: [facility] ENDOSCOPY;  Service: Gastroenterology;  Laterality: N/A;  HEMORRHOIDS    ENDOSCOPY          Social History     Tobacco Use    Smoking status: Never    Smokeless tobacco: Former     Types: Chew   Substance Use Topics    Alcohol use: Never       Family History   Problem Relation Age of Onset    Stomach cancer Mother     Malig Hyperthermia Neg Hx         Health Maintenance Due   Topic Date Due    ZOSTER VACCINE (1 of 2) Never done    ANNUAL PHYSICAL  Never done    COVID-19 Vaccine (3 - Pfizer series) 2021        Current Outpatient Medications on File Prior to Visit   Medication Sig    aspirin 325 MG tablet Take 1 tablet by mouth Daily.    clobetasol (TEMOVATE) 0.05 % cream APPLY TOPICALLY TO AFFECTED AREA(S) TWICE DAILY AS NEEDED FOR FLARES FOR 2 WEEKS. AVOID USE ON FACE OR GROIN    mometasone (ELOCON) 0.1 % cream APPLY TOPICALLY TO AFFECTED AREA(S) TWICE A DAY AS NEEDED FOR FLARES    multivitamin with minerals tablet tablet Take 1 tablet by mouth Daily.    Omega-3 Fatty Acids (fish oil) 1000 MG capsule capsule Take 1 capsule by mouth Daily.    Omeprazole (PRILOSEC PO) Take  by mouth Daily.     "[DISCONTINUED] amLODIPine (NORVASC) 5 MG tablet TAKE 1 TABLET BY MOUTH ONCE DAILY (DUE  FOR  APPOINTMENT)    [DISCONTINUED] benazepril (LOTENSIN) 20 MG tablet Take 1 tablet by mouth once daily    [DISCONTINUED] hydroCHLOROthiazide (HYDRODIURIL) 25 MG tablet Take 1/2 (one-half) tablet by mouth once daily     No current facility-administered medications on file prior to visit.       Immunization History   Administered Date(s) Administered    COVID-19 (PFIZER) Purple Cap Monovalent 08/24/2021, 09/17/2021    Flu Vaccine Intradermal Quad 18-64YR 10/17/2017    Flu Vaccine Quad PF >36MO 09/26/2016, 11/09/2018    Fluzone >6mos 12/27/2021    Hepatitis A 06/20/2018    Tdap 09/26/2016       Review of Systems   Constitutional:  Negative for fatigue and fever.   Respiratory:  Negative for cough and shortness of breath.    Cardiovascular:  Negative for chest pain, palpitations and leg swelling.   Genitourinary:  Positive for nocturia (2 times a night). Negative for dysuria and hematuria.      Objective     Vitals:    08/10/23 0844 08/10/23 0849 08/10/23 0929   BP: 156/86 164/83 143/85   BP Location: Left arm  Left arm   Patient Position: Sitting  Sitting   Cuff Size: Adult  Adult   Pulse: 61  57   SpO2: 98%  98%   Weight: 109 kg (239 lb 3.2 oz)     Height: 185.4 cm (72.99\")              Physical Exam  Vitals reviewed.   Constitutional:       General: He is not in acute distress.     Appearance: Normal appearance.   Neck:      Vascular: No carotid bruit.   Cardiovascular:      Rate and Rhythm: Normal rate and regular rhythm.      Heart sounds: Normal heart sounds. No murmur heard.  Pulmonary:      Effort: Pulmonary effort is normal. No respiratory distress.      Breath sounds: Normal breath sounds.   Musculoskeletal:      Right lower leg: No edema.      Left lower leg: No edema.   Neurological:      Mental Status: He is alert.   Psychiatric:         Mood and Affect: Mood normal.         Behavior: Behavior normal.       Result " Review :     The following data was reviewed by: FRANCO Doran on 08/10/2023:                       Assessment and Plan      Diagnoses and all orders for this visit:    1. Essential hypertension (Primary)  Assessment & Plan:  BP is up, Continue to monitor BP at home. Continue current meds. Continue to modify diet and lifestyle. Return fasting for labs within the next week.       Orders:  -     Comprehensive Metabolic Panel; Future  -     Lipid Panel; Future  -     amLODIPine (NORVASC) 5 MG tablet; Take 1 tablet by mouth Daily.  Dispense: 90 tablet; Refill: 1  -     benazepril (LOTENSIN) 20 MG tablet; Take 1 tablet by mouth Daily.  Dispense: 90 tablet; Refill: 1  -     hydroCHLOROthiazide (HYDRODIURIL) 25 MG tablet; Take 0.5 tablets by mouth Daily.  Dispense: 45 tablet; Refill: 1    2. Screening for prostate cancer  Assessment & Plan:  Discussed PSA screening, will check a PSA with future labs, he will return for a physical for his next visit     Orders:  -     PSA SCREENING; Future    3. ROSIBEL on CPAP  Assessment & Plan:  continue to use his cpap nightly           BMI is >= 30 and <35. (Class 1 Obesity). The following options were offered after discussion;: exercise counseling/recommendations and nutrition counseling/recommendations         Follow Up     Return for fasting for labs, Annual physical.    Patient was given instructions and counseling regarding his condition or for health maintenance advice. Please see specific information pulled into the AVS if appropriate.

## 2023-08-10 NOTE — ASSESSMENT & PLAN NOTE
BP is up, Continue to monitor BP at home. Continue current meds. Continue to modify diet and lifestyle. Return fasting for labs within the next week.

## 2023-08-14 ENCOUNTER — LAB (OUTPATIENT)
Dept: LAB | Facility: HOSPITAL | Age: 63
End: 2023-08-14
Payer: COMMERCIAL

## 2023-08-14 DIAGNOSIS — I10 ESSENTIAL HYPERTENSION: ICD-10-CM

## 2023-08-14 DIAGNOSIS — Z12.5 SCREENING FOR PROSTATE CANCER: ICD-10-CM

## 2023-08-14 LAB
ALBUMIN SERPL-MCNC: 4.2 G/DL (ref 3.5–5.2)
ALBUMIN/GLOB SERPL: 1.9 G/DL
ALP SERPL-CCNC: 73 U/L (ref 39–117)
ALT SERPL W P-5'-P-CCNC: 22 U/L (ref 1–41)
ANION GAP SERPL CALCULATED.3IONS-SCNC: 8.9 MMOL/L (ref 5–15)
AST SERPL-CCNC: 20 U/L (ref 1–40)
BILIRUB SERPL-MCNC: 1.3 MG/DL (ref 0–1.2)
BUN SERPL-MCNC: 14 MG/DL (ref 8–23)
BUN/CREAT SERPL: 14 (ref 7–25)
CALCIUM SPEC-SCNC: 9.2 MG/DL (ref 8.6–10.5)
CHLORIDE SERPL-SCNC: 103 MMOL/L (ref 98–107)
CHOLEST SERPL-MCNC: 171 MG/DL (ref 0–200)
CO2 SERPL-SCNC: 28.1 MMOL/L (ref 22–29)
CREAT SERPL-MCNC: 1 MG/DL (ref 0.76–1.27)
EGFRCR SERPLBLD CKD-EPI 2021: 85.6 ML/MIN/1.73
GLOBULIN UR ELPH-MCNC: 2.2 GM/DL
GLUCOSE SERPL-MCNC: 100 MG/DL (ref 65–99)
HDLC SERPL-MCNC: 37 MG/DL (ref 40–60)
LDLC SERPL CALC-MCNC: 113 MG/DL (ref 0–100)
LDLC/HDLC SERPL: 3 {RATIO}
POTASSIUM SERPL-SCNC: 4.6 MMOL/L (ref 3.5–5.2)
PROT SERPL-MCNC: 6.4 G/DL (ref 6–8.5)
PSA SERPL-MCNC: 0.81 NG/ML (ref 0–4)
SODIUM SERPL-SCNC: 140 MMOL/L (ref 136–145)
TRIGL SERPL-MCNC: 115 MG/DL (ref 0–150)
VLDLC SERPL-MCNC: 21 MG/DL (ref 5–40)

## 2023-08-14 PROCEDURE — 80061 LIPID PANEL: CPT

## 2023-08-14 PROCEDURE — G0103 PSA SCREENING: HCPCS

## 2023-08-14 PROCEDURE — 80053 COMPREHEN METABOLIC PANEL: CPT

## 2023-08-14 PROCEDURE — 36415 COLL VENOUS BLD VENIPUNCTURE: CPT

## 2024-01-30 DIAGNOSIS — I10 ESSENTIAL HYPERTENSION: ICD-10-CM

## 2024-01-30 RX ORDER — HYDROCHLOROTHIAZIDE 25 MG/1
12.5 TABLET ORAL DAILY
Qty: 15 TABLET | Refills: 0 | Status: SHIPPED | OUTPATIENT
Start: 2024-01-30

## 2024-01-30 RX ORDER — BENAZEPRIL HYDROCHLORIDE 20 MG/1
20 TABLET ORAL DAILY
Qty: 30 TABLET | Refills: 0 | Status: SHIPPED | OUTPATIENT
Start: 2024-01-30

## 2024-01-30 RX ORDER — AMLODIPINE BESYLATE 5 MG/1
5 TABLET ORAL DAILY
Qty: 30 TABLET | Refills: 0 | Status: SHIPPED | OUTPATIENT
Start: 2024-01-30

## 2024-01-30 NOTE — TELEPHONE ENCOUNTER
Rx Refill Note  Requested Prescriptions     Pending Prescriptions Disp Refills    amLODIPine (NORVASC) 5 MG tablet [Pharmacy Med Name: amLODIPine Besylate 5 MG Oral Tablet] 90 tablet 0     Sig: Take 1 tablet by mouth once daily    benazepril (LOTENSIN) 20 MG tablet [Pharmacy Med Name: Benazepril HCl 20 MG Oral Tablet] 90 tablet 0     Sig: Take 1 tablet by mouth once daily    hydroCHLOROthiazide (HYDRODIURIL) 25 MG tablet [Pharmacy Med Name: hydroCHLOROthiazide 25 MG Oral Tablet] 45 tablet 0     Sig: Take 1/2 (one-half) tablet by mouth once daily      Last office visit with prescribing clinician: 8/10/2023   Last telemedicine visit with prescribing clinician: Visit date not found   Next office visit with prescribing clinician: Visit date not found      Nanci Nunez LPN  01/30/24, 10:35 EST

## 2024-03-07 ENCOUNTER — OFFICE VISIT (OUTPATIENT)
Dept: FAMILY MEDICINE CLINIC | Age: 64
End: 2024-03-07
Payer: COMMERCIAL

## 2024-03-07 VITALS
HEIGHT: 73 IN | BODY MASS INDEX: 32.02 KG/M2 | SYSTOLIC BLOOD PRESSURE: 156 MMHG | HEART RATE: 62 BPM | DIASTOLIC BLOOD PRESSURE: 81 MMHG | TEMPERATURE: 97.8 F | WEIGHT: 241.6 LBS

## 2024-03-07 DIAGNOSIS — I10 ESSENTIAL HYPERTENSION: Primary | ICD-10-CM

## 2024-03-07 DIAGNOSIS — N52.9 ERECTILE DYSFUNCTION, UNSPECIFIED ERECTILE DYSFUNCTION TYPE: ICD-10-CM

## 2024-03-07 PROCEDURE — 99213 OFFICE O/P EST LOW 20 MIN: CPT | Performed by: NURSE PRACTITIONER

## 2024-03-07 RX ORDER — SILDENAFIL 100 MG/1
TABLET, FILM COATED ORAL
Qty: 30 TABLET | Refills: 0 | Status: SHIPPED | OUTPATIENT
Start: 2024-03-07

## 2024-03-07 RX ORDER — HYDROCHLOROTHIAZIDE 25 MG/1
12.5 TABLET ORAL DAILY
Qty: 90 TABLET | Refills: 1 | Status: SHIPPED | OUTPATIENT
Start: 2024-03-07

## 2024-03-07 RX ORDER — AMLODIPINE BESYLATE 5 MG/1
5 TABLET ORAL DAILY
Qty: 90 TABLET | Refills: 1 | Status: SHIPPED | OUTPATIENT
Start: 2024-03-07

## 2024-03-07 RX ORDER — BENAZEPRIL HYDROCHLORIDE 20 MG/1
20 TABLET ORAL DAILY
Qty: 90 TABLET | Refills: 1 | Status: SHIPPED | OUTPATIENT
Start: 2024-03-07

## 2024-03-07 NOTE — ASSESSMENT & PLAN NOTE
BP up today, log sheet given to recheck bp. Continue to monitor BP at home. Continue current meds. Continue to modify diet and lifestyle. He may get his labs today if not to busy in lab or return in the next few days.  Discussed healthy eating, low salt diet, weight loss and regular exercise.

## 2024-03-07 NOTE — PROGRESS NOTES
Chief Complaint  Hypertension (6 month follow up and medication refills. )    Subjective          Filemon Lazaro presents to [facility]    History of Present Illness  Hypertension:  Current medication: norvasc, lotensin and HCTZ   Tolerating Medication: Yes  Checking BP at home and it is: 138/79  Needs refills: Yes to [facility]   Labs:  Lab Results       Component                Value               Date                       GLUCOSE                  100 (H)             2023                 BUN                      14                  2023                 CREATININE               1.00                2023                 EGFRIFNONA               70                  2021                 BCR                      14.0                2023                 K                        4.6                 2023                 CO2                      28.1                2023                 CALCIUM                  9.2                 2023                 ALBUMIN                  4.2                 2023                 LABIL2                   1.8                 2021                 AST                      20                  2023                 ALT                      22                  2023        He also has GERD and takes prilosec OTC that helps          PAST MEDICAL HISTORY changes since 8-:            Hypertension     Sleep Apnea: dx'd in ; now using cpap regularly         + Covid          Surgical History:         Vasectomy     Procedures: EGD 2017     COLONOSCOPY: was last done   normal, next one due , Dr Posada,  & 2016 with the following abnormalaties noted-- hemorrhoids and diverticuli          Family History:     Father:  at age 75; Cause of death was COPD;  Hypertension;    Mother:  age 84, uterine cancer     Brother(s): Healthy; 1 brother(s) total      Sister(s): Healthy; 1 sister(s) total     Paternal Grandfather:  at age 72;  Hypertension     Paternal Grandmother:  at age 70's; Cause of death was stroke;  Hypertension     Maternal Grandfather:  at age 81; Cause of death was COPD     Maternal Grandmother:  at age 95; Cause of death was pneumonia         Social History:     Occupation: [employer] technician     Marital Status:      Children: 1 child                       Past Medical History:   Diagnosis Date    GERD (gastroesophageal reflux disease)     Hypertension     Sleep apnea     does not use cpap        No Known Allergies     Past Surgical History:   Procedure Laterality Date    COLONOSCOPY      COLONOSCOPY N/A 8/10/2021    Procedure: COLONOSCOPY;  Surgeon: Mat Posada MD;  Location: [facility] ENDOSCOPY;  Service: Gastroenterology;  Laterality: N/A;  HEMORRHOIDS    ENDOSCOPY          Social History     Tobacco Use    Smoking status: Never    Smokeless tobacco: Former     Types: Chew   Substance Use Topics    Alcohol use: Never       Family History   Problem Relation Age of Onset    Stomach cancer Mother     Malig Hyperthermia Neg Hx         Health Maintenance Due   Topic Date Due    ZOSTER VACCINE (1 of 2) Never done    ANNUAL PHYSICAL  Never done        Current Outpatient Medications on File Prior to Visit   Medication Sig    aspirin 325 MG tablet Take 1 tablet by mouth Daily.    clobetasol (TEMOVATE) 0.05 % cream APPLY TOPICALLY TO AFFECTED AREA(S) TWICE DAILY AS NEEDED FOR FLARES FOR 2 WEEKS. AVOID USE ON FACE OR GROIN    mometasone (ELOCON) 0.1 % cream APPLY TOPICALLY TO AFFECTED AREA(S) TWICE A DAY AS NEEDED FOR FLARES    multivitamin with minerals tablet tablet Take 1 tablet by mouth Daily.    Omega-3 Fatty Acids (fish oil) 1000 MG capsule capsule Take 1 capsule by mouth Daily.    Omeprazole (PRILOSEC PO) Take  by mouth Daily.    [DISCONTINUED] amLODIPine (NORVASC) 5 MG tablet Take 1  "tablet by mouth Daily. DUE FOR FOLLOW UP OFFICE VISIT FOR REFILLS    [DISCONTINUED] benazepril (LOTENSIN) 20 MG tablet Take 1 tablet by mouth Daily. DUE FOR FOLLOW UP OFFICE VISIT FOR REFILLS    [DISCONTINUED] hydroCHLOROthiazide (HYDRODIURIL) 25 MG tablet Take 0.5 tablets by mouth Daily. DUE FOR FOLLOW UP OFFICE VISIT FOR REFILLS     No current facility-administered medications on file prior to visit.       Immunization History   Administered Date(s) Administered    COVID-19 (PFIZER) Purple Cap Monovalent 08/24/2021, 09/17/2021    Flu Vaccine Intradermal Quad 18-64YR 10/17/2017    Flu Vaccine Quad PF >36MO 09/26/2016, 11/09/2018    Fluzone (or Fluarix & Flulaval for VFC) >6mos 12/27/2021    Hepatitis A 06/20/2018    Tdap 09/26/2016       Review of Systems   Constitutional:  Negative for fatigue and fever.   Respiratory:  Negative for cough and shortness of breath.    Cardiovascular:  Negative for chest pain, palpitations and leg swelling.   Genitourinary:  Positive for erectile dysfunction (wants to get rx for Viagra again, rx helped in past).   Psychiatric/Behavioral:  Positive for stress (work related today, due to an issue at work last night, not a daily issue, he is in a hurry today and has to get back to work this am).         Objective     Vitals:    03/07/24 0904   BP: 156/81   BP Location: Left arm   Patient Position: Sitting   Cuff Size: Large Adult   Pulse: 62   Temp: 97.8 °F (36.6 °C)   TempSrc: Oral   Weight: 110 kg (241 lb 9.6 oz)   Height: 185.4 cm (72.99\")            Physical Exam  Vitals reviewed.   Constitutional:       General: He is not in acute distress.     Appearance: Normal appearance.   Neck:      Vascular: No carotid bruit.   Cardiovascular:      Rate and Rhythm: Normal rate and regular rhythm.      Heart sounds: Normal heart sounds. No murmur heard.  Pulmonary:      Effort: Pulmonary effort is normal. No respiratory distress.      Breath sounds: Normal breath sounds.   Musculoskeletal:     "  Right lower leg: No edema.      Left lower leg: No edema.   Neurological:      Mental Status: He is alert.   Psychiatric:         Mood and Affect: Mood normal.         Behavior: Behavior normal.         Result Review :     The following data was reviewed by: FRANCO Doran on 03/07/2024:                       Assessment and Plan      Diagnoses and all orders for this visit:    1. Essential hypertension (Primary)  Assessment & Plan:  BP up today, log sheet given to recheck bp. Continue to monitor BP at home. Continue current meds. Continue to modify diet and lifestyle. He may get his labs today if not to busy in lab or return in the next few days.  Discussed healthy eating, low salt diet, weight loss and regular exercise.       Orders:  -     Comprehensive Metabolic Panel; Future  -     Lipid Panel; Future  -     amLODIPine (NORVASC) 5 MG tablet; Take 1 tablet by mouth Daily.  Dispense: 90 tablet; Refill: 1  -     benazepril (LOTENSIN) 20 MG tablet; Take 1 tablet by mouth Daily.  Dispense: 90 tablet; Refill: 1  -     hydroCHLOROthiazide 25 MG tablet; Take 0.5 tablets by mouth Daily.  Dispense: 90 tablet; Refill: 1    2. Erectile dysfunction, unspecified erectile dysfunction type  Assessment & Plan:  Reviewed risk vs benefit of rx     Orders:  -     sildenafil (Viagra) 100 MG tablet; 1/2 - 1 prn  Dispense: 30 tablet; Refill: 0                  Follow Up     Return for followup pending lab results.    Patient was given instructions and counseling regarding his condition or for health maintenance advice. Please see specific information pulled into the AVS if appropriate.

## 2024-03-14 ENCOUNTER — LAB (OUTPATIENT)
Dept: LAB | Facility: HOSPITAL | Age: 64
End: 2024-03-14
Payer: COMMERCIAL

## 2024-03-14 DIAGNOSIS — I10 ESSENTIAL HYPERTENSION: ICD-10-CM

## 2024-03-14 LAB
ALBUMIN SERPL-MCNC: 4.4 G/DL (ref 3.5–5.2)
ALBUMIN/GLOB SERPL: 2.1 G/DL
ALP SERPL-CCNC: 74 U/L (ref 39–117)
ALT SERPL W P-5'-P-CCNC: 25 U/L (ref 1–41)
ANION GAP SERPL CALCULATED.3IONS-SCNC: 9 MMOL/L (ref 5–15)
AST SERPL-CCNC: 18 U/L (ref 1–40)
BILIRUB SERPL-MCNC: 1.5 MG/DL (ref 0–1.2)
BUN SERPL-MCNC: 12 MG/DL (ref 8–23)
BUN/CREAT SERPL: 11.2 (ref 7–25)
CALCIUM SPEC-SCNC: 9.3 MG/DL (ref 8.6–10.5)
CHLORIDE SERPL-SCNC: 100 MMOL/L (ref 98–107)
CHOLEST SERPL-MCNC: 175 MG/DL (ref 0–200)
CO2 SERPL-SCNC: 29 MMOL/L (ref 22–29)
CREAT SERPL-MCNC: 1.07 MG/DL (ref 0.76–1.27)
EGFRCR SERPLBLD CKD-EPI 2021: 78.5 ML/MIN/1.73
GLOBULIN UR ELPH-MCNC: 2.1 GM/DL
GLUCOSE SERPL-MCNC: 105 MG/DL (ref 65–99)
HDLC SERPL-MCNC: 38 MG/DL (ref 40–60)
LDLC SERPL CALC-MCNC: 116 MG/DL (ref 0–100)
LDLC/HDLC SERPL: 2.99 {RATIO}
POTASSIUM SERPL-SCNC: 4.3 MMOL/L (ref 3.5–5.2)
PROT SERPL-MCNC: 6.5 G/DL (ref 6–8.5)
SODIUM SERPL-SCNC: 138 MMOL/L (ref 136–145)
TRIGL SERPL-MCNC: 116 MG/DL (ref 0–150)
VLDLC SERPL-MCNC: 21 MG/DL (ref 5–40)

## 2024-03-14 PROCEDURE — 36415 COLL VENOUS BLD VENIPUNCTURE: CPT

## 2024-03-14 PROCEDURE — 80053 COMPREHEN METABOLIC PANEL: CPT

## 2024-03-14 PROCEDURE — 80061 LIPID PANEL: CPT

## 2024-03-15 DIAGNOSIS — R17 ELEVATED BILIRUBIN: Primary | ICD-10-CM

## 2024-08-28 DIAGNOSIS — I10 ESSENTIAL HYPERTENSION: ICD-10-CM

## 2024-08-28 RX ORDER — HYDROCHLOROTHIAZIDE 25 MG/1
12.5 TABLET ORAL DAILY
Qty: 90 TABLET | Refills: 0 | Status: CANCELLED | OUTPATIENT
Start: 2024-08-28

## 2024-08-28 RX ORDER — AMLODIPINE BESYLATE 5 MG/1
5 TABLET ORAL DAILY
Qty: 90 TABLET | Refills: 0 | OUTPATIENT
Start: 2024-08-28

## 2024-08-28 RX ORDER — BENAZEPRIL HYDROCHLORIDE 20 MG/1
20 TABLET ORAL DAILY
Qty: 90 TABLET | Refills: 0 | OUTPATIENT
Start: 2024-08-28

## 2024-08-28 NOTE — TELEPHONE ENCOUNTER
Failed protocol    LOV  3/7/24    LF  5/31/24  #90    Comprehensive Metabolic Panel (03/14/2024 09:12)

## 2024-08-29 DIAGNOSIS — I10 ESSENTIAL HYPERTENSION: ICD-10-CM

## 2024-09-03 RX ORDER — AMLODIPINE BESYLATE 5 MG/1
5 TABLET ORAL DAILY
Qty: 30 TABLET | Refills: 0 | Status: SHIPPED | OUTPATIENT
Start: 2024-09-03

## 2024-09-03 RX ORDER — BENAZEPRIL HYDROCHLORIDE 20 MG/1
20 TABLET ORAL DAILY
Qty: 30 TABLET | Refills: 0 | Status: SHIPPED | OUTPATIENT
Start: 2024-09-03

## 2024-09-03 NOTE — TELEPHONE ENCOUNTER
If he is going to be out of rx send in 30 days, if not remove and I will refill when he comes in for his scheduled upcoming follow up appt

## 2024-09-03 NOTE — TELEPHONE ENCOUNTER
"    Caller: Filemon Lazaro \"Swapnil\"    Relationship to patient: Self    Best call back number: [phone]    Patient is needing: PATIENT IS COMPLETELY OUT OF THESE MEDICATIONS AS OF THIS MORNING 09.03.2024     "

## 2024-09-03 NOTE — TELEPHONE ENCOUNTER
Failed protocol    LOV  3/7/24    LF  5/31/24 #90 both meds     Comprehensive Metabolic Panel (03/14/2024 9:12)

## 2024-09-05 ENCOUNTER — LAB (OUTPATIENT)
Dept: LAB | Facility: HOSPITAL | Age: 64
End: 2024-09-05
Payer: COMMERCIAL

## 2024-09-05 DIAGNOSIS — R17 ELEVATED BILIRUBIN: ICD-10-CM

## 2024-09-05 LAB
ALBUMIN SERPL-MCNC: 3.9 G/DL (ref 3.5–5.2)
ALBUMIN/GLOB SERPL: 1.4 G/DL
ALP SERPL-CCNC: 82 U/L (ref 39–117)
ALT SERPL W P-5'-P-CCNC: 22 U/L (ref 1–41)
ANION GAP SERPL CALCULATED.3IONS-SCNC: 9.3 MMOL/L (ref 5–15)
AST SERPL-CCNC: 19 U/L (ref 1–40)
BILIRUB SERPL-MCNC: 0.9 MG/DL (ref 0–1.2)
BUN SERPL-MCNC: 13 MG/DL (ref 8–23)
BUN/CREAT SERPL: 13.3 (ref 7–25)
CALCIUM SPEC-SCNC: 9 MG/DL (ref 8.6–10.5)
CHLORIDE SERPL-SCNC: 99 MMOL/L (ref 98–107)
CO2 SERPL-SCNC: 28.7 MMOL/L (ref 22–29)
CREAT SERPL-MCNC: 0.98 MG/DL (ref 0.76–1.27)
EGFRCR SERPLBLD CKD-EPI 2021: 87.2 ML/MIN/1.73
GLOBULIN UR ELPH-MCNC: 2.7 GM/DL
GLUCOSE SERPL-MCNC: 100 MG/DL (ref 65–99)
POTASSIUM SERPL-SCNC: 4.5 MMOL/L (ref 3.5–5.2)
PROT SERPL-MCNC: 6.6 G/DL (ref 6–8.5)
SODIUM SERPL-SCNC: 137 MMOL/L (ref 136–145)

## 2024-09-05 PROCEDURE — 36415 COLL VENOUS BLD VENIPUNCTURE: CPT

## 2024-09-05 PROCEDURE — 80053 COMPREHEN METABOLIC PANEL: CPT

## 2024-09-16 ENCOUNTER — OFFICE VISIT (OUTPATIENT)
Dept: FAMILY MEDICINE CLINIC | Age: 64
End: 2024-09-16
Payer: COMMERCIAL

## 2024-09-16 VITALS
TEMPERATURE: 98.2 F | HEART RATE: 69 BPM | DIASTOLIC BLOOD PRESSURE: 73 MMHG | WEIGHT: 236.2 LBS | SYSTOLIC BLOOD PRESSURE: 135 MMHG | BODY MASS INDEX: 31.3 KG/M2 | HEIGHT: 73 IN

## 2024-09-16 DIAGNOSIS — I10 ESSENTIAL HYPERTENSION: Primary | ICD-10-CM

## 2024-09-16 DIAGNOSIS — Z00.00 ROUTINE GENERAL MEDICAL EXAMINATION AT A HEALTH CARE FACILITY: ICD-10-CM

## 2024-09-16 DIAGNOSIS — Z12.5 SCREENING FOR PROSTATE CANCER: ICD-10-CM

## 2024-09-16 PROCEDURE — 99396 PREV VISIT EST AGE 40-64: CPT | Performed by: NURSE PRACTITIONER

## 2024-09-16 RX ORDER — AMLODIPINE BESYLATE 5 MG/1
5 TABLET ORAL DAILY
Qty: 90 TABLET | Refills: 1 | Status: SHIPPED | OUTPATIENT
Start: 2024-09-16

## 2024-09-16 RX ORDER — BENAZEPRIL HYDROCHLORIDE 20 MG/1
20 TABLET ORAL DAILY
Qty: 90 TABLET | Refills: 1 | Status: SHIPPED | OUTPATIENT
Start: 2024-09-16

## 2024-09-16 RX ORDER — HYDROCHLOROTHIAZIDE 25 MG/1
12.5 TABLET ORAL DAILY
Qty: 90 TABLET | Refills: 1 | Status: SHIPPED | OUTPATIENT
Start: 2024-09-16

## 2024-09-26 ENCOUNTER — LAB (OUTPATIENT)
Dept: LAB | Facility: HOSPITAL | Age: 64
End: 2024-09-26
Payer: COMMERCIAL

## 2024-09-26 DIAGNOSIS — I10 ESSENTIAL HYPERTENSION: ICD-10-CM

## 2024-09-26 DIAGNOSIS — Z00.00 ROUTINE GENERAL MEDICAL EXAMINATION AT A HEALTH CARE FACILITY: ICD-10-CM

## 2024-09-26 DIAGNOSIS — Z12.5 SCREENING FOR PROSTATE CANCER: ICD-10-CM

## 2024-09-26 LAB
BASOPHILS # BLD AUTO: 0.04 10*3/MM3 (ref 0–0.2)
BASOPHILS NFR BLD AUTO: 0.8 % (ref 0–1.5)
CHOLEST SERPL-MCNC: 160 MG/DL (ref 0–200)
DEPRECATED RDW RBC AUTO: 42.7 FL (ref 37–54)
EOSINOPHIL # BLD AUTO: 0.19 10*3/MM3 (ref 0–0.4)
EOSINOPHIL NFR BLD AUTO: 3.6 % (ref 0.3–6.2)
ERYTHROCYTE [DISTWIDTH] IN BLOOD BY AUTOMATED COUNT: 12.4 % (ref 12.3–15.4)
HCT VFR BLD AUTO: 46.1 % (ref 37.5–51)
HDLC SERPL-MCNC: 37 MG/DL (ref 40–60)
HGB BLD-MCNC: 15.7 G/DL (ref 13–17.7)
IMM GRANULOCYTES # BLD AUTO: 0.02 10*3/MM3 (ref 0–0.05)
IMM GRANULOCYTES NFR BLD AUTO: 0.4 % (ref 0–0.5)
LDLC SERPL CALC-MCNC: 104 MG/DL (ref 0–100)
LDLC/HDLC SERPL: 2.78 {RATIO}
LYMPHOCYTES # BLD AUTO: 1.45 10*3/MM3 (ref 0.7–3.1)
LYMPHOCYTES NFR BLD AUTO: 27.5 % (ref 19.6–45.3)
MCH RBC QN AUTO: 31.3 PG (ref 26.6–33)
MCHC RBC AUTO-ENTMCNC: 34.1 G/DL (ref 31.5–35.7)
MCV RBC AUTO: 91.8 FL (ref 79–97)
MONOCYTES # BLD AUTO: 0.62 10*3/MM3 (ref 0.1–0.9)
MONOCYTES NFR BLD AUTO: 11.7 % (ref 5–12)
NEUTROPHILS NFR BLD AUTO: 2.96 10*3/MM3 (ref 1.7–7)
NEUTROPHILS NFR BLD AUTO: 56 % (ref 42.7–76)
PLATELET # BLD AUTO: 179 10*3/MM3 (ref 140–450)
PMV BLD AUTO: 10.5 FL (ref 6–12)
PSA SERPL-MCNC: 0.84 NG/ML (ref 0–4)
RBC # BLD AUTO: 5.02 10*6/MM3 (ref 4.14–5.8)
T4 FREE SERPL-MCNC: 1.12 NG/DL (ref 0.92–1.68)
TRIGL SERPL-MCNC: 100 MG/DL (ref 0–150)
TSH SERPL DL<=0.05 MIU/L-ACNC: 4.28 UIU/ML (ref 0.27–4.2)
VLDLC SERPL-MCNC: 19 MG/DL (ref 5–40)
WBC NRBC COR # BLD AUTO: 5.28 10*3/MM3 (ref 3.4–10.8)

## 2024-09-26 PROCEDURE — 85025 COMPLETE CBC W/AUTO DIFF WBC: CPT

## 2024-09-26 PROCEDURE — 36415 COLL VENOUS BLD VENIPUNCTURE: CPT

## 2024-09-26 PROCEDURE — 84439 ASSAY OF FREE THYROXINE: CPT

## 2024-09-26 PROCEDURE — G0103 PSA SCREENING: HCPCS

## 2024-09-26 PROCEDURE — 84443 ASSAY THYROID STIM HORMONE: CPT

## 2024-09-26 PROCEDURE — 80061 LIPID PANEL: CPT

## 2025-03-17 ENCOUNTER — LAB (OUTPATIENT)
Dept: LAB | Facility: HOSPITAL | Age: 65
End: 2025-03-17
Payer: COMMERCIAL

## 2025-03-17 ENCOUNTER — OFFICE VISIT (OUTPATIENT)
Dept: FAMILY MEDICINE CLINIC | Age: 65
End: 2025-03-17
Payer: COMMERCIAL

## 2025-03-17 VITALS
HEART RATE: 64 BPM | HEIGHT: 73 IN | WEIGHT: 228.4 LBS | OXYGEN SATURATION: 100 % | SYSTOLIC BLOOD PRESSURE: 138 MMHG | DIASTOLIC BLOOD PRESSURE: 76 MMHG | TEMPERATURE: 97.6 F | BODY MASS INDEX: 30.27 KG/M2

## 2025-03-17 DIAGNOSIS — I10 ESSENTIAL HYPERTENSION: Primary | ICD-10-CM

## 2025-03-17 DIAGNOSIS — Z23 IMMUNIZATION DUE: ICD-10-CM

## 2025-03-17 DIAGNOSIS — M25.552 LEFT HIP PAIN: ICD-10-CM

## 2025-03-17 LAB
ALBUMIN SERPL-MCNC: 3.9 G/DL (ref 3.5–5.2)
ALBUMIN/GLOB SERPL: 1.4 G/DL
ALP SERPL-CCNC: 91 U/L (ref 39–117)
ALT SERPL W P-5'-P-CCNC: 19 U/L (ref 1–41)
ANION GAP SERPL CALCULATED.3IONS-SCNC: 9.3 MMOL/L (ref 5–15)
AST SERPL-CCNC: 20 U/L (ref 1–40)
BILIRUB SERPL-MCNC: 0.7 MG/DL (ref 0–1.2)
BUN SERPL-MCNC: 16 MG/DL (ref 8–23)
BUN/CREAT SERPL: 15 (ref 7–25)
CALCIUM SPEC-SCNC: 9.4 MG/DL (ref 8.6–10.5)
CHLORIDE SERPL-SCNC: 103 MMOL/L (ref 98–107)
CO2 SERPL-SCNC: 28.7 MMOL/L (ref 22–29)
CREAT SERPL-MCNC: 1.07 MG/DL (ref 0.76–1.27)
EGFRCR SERPLBLD CKD-EPI 2021: 78 ML/MIN/1.73
GLOBULIN UR ELPH-MCNC: 2.8 GM/DL
GLUCOSE SERPL-MCNC: 94 MG/DL (ref 65–99)
POTASSIUM SERPL-SCNC: 4 MMOL/L (ref 3.5–5.2)
PROT SERPL-MCNC: 6.7 G/DL (ref 6–8.5)
SODIUM SERPL-SCNC: 141 MMOL/L (ref 136–145)

## 2025-03-17 PROCEDURE — 90750 HZV VACC RECOMBINANT IM: CPT | Performed by: NURSE PRACTITIONER

## 2025-03-17 PROCEDURE — 80053 COMPREHEN METABOLIC PANEL: CPT | Performed by: NURSE PRACTITIONER

## 2025-03-17 PROCEDURE — 36415 COLL VENOUS BLD VENIPUNCTURE: CPT | Performed by: NURSE PRACTITIONER

## 2025-03-17 PROCEDURE — 99213 OFFICE O/P EST LOW 20 MIN: CPT | Performed by: NURSE PRACTITIONER

## 2025-03-17 RX ORDER — BENAZEPRIL HYDROCHLORIDE 20 MG/1
20 TABLET ORAL DAILY
Qty: 90 TABLET | Refills: 1 | Status: SHIPPED | OUTPATIENT
Start: 2025-03-17

## 2025-03-17 RX ORDER — HYDROCHLOROTHIAZIDE 25 MG/1
12.5 TABLET ORAL DAILY
Qty: 90 TABLET | Refills: 1 | Status: SHIPPED | OUTPATIENT
Start: 2025-03-17

## 2025-03-17 RX ORDER — AMLODIPINE BESYLATE 5 MG/1
5 TABLET ORAL DAILY
Qty: 90 TABLET | Refills: 1 | Status: SHIPPED | OUTPATIENT
Start: 2025-03-17

## 2025-03-17 NOTE — PROGRESS NOTES
Chief Complaint  Hypertension (6 month follow up )    Subjective          Filemon Lazaro presents to [facility]    History of Present Illness  Hypertension:  Current medication: norvasc, lotensin, HCTZ   Tolerating Medication: Yes  Checking BP at home and it is: 130-145 usually over 70's -80  Needs refills: Yes. [facility]  Labs:  Lab Results       Component                Value               Date                       GLUCOSE                  100 (H)             2024                 BUN                      13                  2024                 CREATININE               0.98                2024                 EGFRIFNONA               70                  2021                 BCR                      13.3                2024                 K                        4.5                 2024                 CO2                      28.7                2024                 CALCIUM                  9.0                 2024                 ALBUMIN                  3.9                 2024                 AST                      19                  2024                 ALT                      22                  2024              PAST MEDICAL HISTORY changes since 2024:            Hypertension     Sleep Apnea: dx'd in ; now using cpap regularly         + Covid  &          Surgical History:         Vasectomy     Procedures: EGD 2017     COLONOSCOPY: was last done   normal, next one due , Dr Posada,  & 2016 with the following abnormalaties noted-- hemorrhoids and diverticuli          Family History:        Father:  at age 75; Cause of death was COPD;  Hypertension;    Mother:  age 84, uterine cancer     Brother(s): Healthy; 1 brother(s) total     Sister(s): Healthy; 1 sister(s) total     Paternal Grandfather:  at age 72;  Hypertension     Paternal Grandmother:   at age 70's; Cause of death was stroke;  Hypertension     Maternal Grandfather:  at age 81; Cause of death was COPD     Maternal Grandmother:  at age 95; Cause of death was pneumonia         Social History:        Occupation: [employer]     Marital Status:      Children: 1 child                    Past Medical History:   Diagnosis Date    GERD (gastroesophageal reflux disease)     Hypertension     Sleep apnea     does not use cpap        No Known Allergies     Past Surgical History:   Procedure Laterality Date    COLONOSCOPY  2016    COLONOSCOPY N/A 8/10/2021    Procedure: COLONOSCOPY;  Surgeon: Mat Posada MD;  Location: [facility];  Service: Gastroenterology;  Laterality: N/A;  HEMORRHOIDS    ENDOSCOPY  2016        Social History     Tobacco Use    Smoking status: Never    Smokeless tobacco: Former     Types: Chew   Substance Use Topics    Alcohol use: Never       Family History   Problem Relation Age of Onset    Stomach cancer Mother     Malig Hyperthermia Neg Hx         Health Maintenance Due   Topic Date Due    Pneumococcal Vaccine 50+ (1 of 1 - PCV) Never done    INFLUENZA VACCINE  2024    COVID-19 Vaccine (3 - 2024- season) 2024        Current Outpatient Medications on File Prior to Visit   Medication Sig    aspirin 325 MG tablet Take 1 tablet by mouth Daily.    clobetasol (TEMOVATE) 0.05 % cream APPLY TOPICALLY TO AFFECTED AREA(S) TWICE DAILY AS NEEDED FOR FLARES FOR 2 WEEKS. AVOID USE ON FACE OR GROIN    mometasone (ELOCON) 0.1 % cream APPLY TOPICALLY TO AFFECTED AREA(S) TWICE A DAY AS NEEDED FOR FLARES    multivitamin with minerals tablet tablet Take 1 tablet by mouth Daily.    Omega-3 Fatty Acids (fish oil) 1000 MG capsule capsule Take 1 capsule by mouth Daily.    Omeprazole (PRILOSEC PO) Take  by mouth Daily.    sildenafil (Viagra) 100 MG tablet 1/2 - 1 prn    [DISCONTINUED] amLODIPine (NORVASC) 5 MG tablet Take 1 tablet by  "mouth Daily.    [DISCONTINUED] benazepril (LOTENSIN) 20 MG tablet Take 1 tablet by mouth Daily.    [DISCONTINUED] hydroCHLOROthiazide 25 MG tablet Take 0.5 tablets by mouth Daily.    Zoster Vac Recomb Adjuvanted (Shingrix) 50 MCG/0.5ML reconstituted suspension Inject 0.5 mL into the appropriate muscle as directed by prescriber. (Patient not taking: Reported on 3/17/2025)     No current facility-administered medications on file prior to visit.       Immunization History   Administered Date(s) Administered    COVID-19 (PFIZER) Purple Cap Monovalent 08/24/2021, 09/17/2021    Flu Vaccine Intradermal Quad 18-64YR 10/17/2017    Flu Vaccine Quad PF >36MO 09/26/2016, 11/09/2018    Fluzone (or Fluarix & Flulaval for VFC) >6mos 12/27/2021    Hepatitis A 06/20/2018    Shingrix 10/07/2024, 03/17/2025    Tdap 09/26/2016       Review of Systems   Constitutional:  Negative for fatigue and fever.   Respiratory:  Negative for cough and shortness of breath.    Cardiovascular:  Negative for chest pain, palpitations and leg swelling.   Musculoskeletal:  Positive for arthralgias (left hip, started a month, better today).        Objective     Vitals:    03/17/25 1345 03/17/25 1416   BP: 165/85 138/76   BP Location: Left arm Left arm   Patient Position: Sitting Sitting   Cuff Size: Large Adult Adult   Pulse: 65 64   Temp: 97.6 °F (36.4 °C)    TempSrc: Oral    SpO2: 98% 100%   Weight: 104 kg (228 lb 6.4 oz)    Height: 185.4 cm (72.99\")             Physical Exam  Vitals reviewed.   Constitutional:       General: He is not in acute distress.     Appearance: Normal appearance.   Neck:      Vascular: No carotid bruit.   Cardiovascular:      Rate and Rhythm: Normal rate and regular rhythm.      Heart sounds: Normal heart sounds. No murmur heard.  Pulmonary:      Effort: Pulmonary effort is normal. No respiratory distress.      Breath sounds: Normal breath sounds.   Musculoskeletal:      Right lower leg: No edema.      Left lower leg: No edema. "      Comments: Full ROM of left hip    Neurological:      Mental Status: He is alert.   Psychiatric:         Mood and Affect: Mood normal.         Behavior: Behavior normal.         Result Review :     The following data was reviewed by: FRANCO Doran on 03/17/2025:                       Assessment and Plan      Diagnoses and all orders for this visit:    1. Essential hypertension (Primary)  Assessment & Plan:  Repeat BP improved. Continue to monitor BP at home. Continue current meds. Continue to modify diet and lifestyle. Reviewed last labs, he ate a late lunch, will just check a CMP today     Orders:  -     Comprehensive Metabolic Panel  -     amLODIPine (NORVASC) 5 MG tablet; Take 1 tablet by mouth Daily.  Dispense: 90 tablet; Refill: 1  -     benazepril (LOTENSIN) 20 MG tablet; Take 1 tablet by mouth Daily.  Dispense: 90 tablet; Refill: 1  -     hydroCHLOROthiazide 25 MG tablet; Take 0.5 tablets by mouth Daily.  Dispense: 90 tablet; Refill: 1    2. Immunization due  Assessment & Plan:  Had his first shingrex and would like his second dose     Orders:  -     Shingrix Vaccine    3. Left hip pain  Assessment & Plan:  Discussed OTC pain relievers, consider getting a hip x-ray if his pain returns                       Follow Up     Return if symptoms worsen or fail to improve, for followup pending lab results.    Patient was given instructions and counseling regarding his condition or for health maintenance advice. Please see specific information pulled into the AVS if appropriate.

## 2025-03-17 NOTE — ASSESSMENT & PLAN NOTE
Repeat BP improved. Continue to monitor BP at home. Continue current meds. Continue to modify diet and lifestyle. Reviewed last labs, he ate a late lunch, will just check a CMP today

## 2025-03-18 ENCOUNTER — RESULTS FOLLOW-UP (OUTPATIENT)
Dept: FAMILY MEDICINE CLINIC | Age: 65
End: 2025-03-18
Payer: COMMERCIAL

## 2025-03-18 NOTE — LETTER
Filemon Lazaro  [address] [facility] [address]    March 24, 2025     Swapnil,     Your metabolic panel was normal.    Below are the results from your recent visit:    Resulted Orders   Comprehensive Metabolic Panel   Result Value Ref Range    Glucose 94 65 - 99 mg/dL    BUN 16 8 - 23 mg/dL    Creatinine 1.07 0.76 - 1.27 mg/dL    Sodium 141 136 - 145 mmol/L    Potassium 4.0 3.5 - 5.2 mmol/L    Chloride 103 98 - 107 mmol/L    CO2 28.7 22.0 - 29.0 mmol/L    Calcium 9.4 8.6 - 10.5 mg/dL    Total Protein 6.7 6.0 - 8.5 g/dL    Albumin 3.9 3.5 - 5.2 g/dL    ALT (SGPT) 19 1 - 41 U/L    AST (SGOT) 20 1 - 40 U/L    Alkaline Phosphatase 91 39 - 117 U/L    Total Bilirubin 0.7 0.0 - 1.2 mg/dL    Globulin 2.8 gm/dL    A/G Ratio 1.4 g/dL    BUN/Creatinine Ratio 15.0 7.0 - 25.0    Anion Gap 9.3 5.0 - 15.0 mmol/L    eGFR 78.0 >60.0 mL/min/1.73         If you have any questions or concerns, please don't hesitate to call.       Sincerely,      FRANCO Doran

## (undated) DEVICE — Device: Brand: DEFENDO AIR/WATER/SUCTION AND BIOPSY VALVE

## (undated) DEVICE — COLON KIT: Brand: MEDLINE INDUSTRIES, INC.

## (undated) DEVICE — SOL IRRG H2O PL/BG 1000ML STRL